# Patient Record
Sex: FEMALE | Race: WHITE | NOT HISPANIC OR LATINO | Employment: OTHER | URBAN - METROPOLITAN AREA
[De-identification: names, ages, dates, MRNs, and addresses within clinical notes are randomized per-mention and may not be internally consistent; named-entity substitution may affect disease eponyms.]

---

## 2017-09-10 PROBLEM — K43.0 INCISIONAL HERNIA, INCARCERATED: Status: ACTIVE | Noted: 2017-09-10

## 2020-11-17 ENCOUNTER — OFFICE VISIT (OUTPATIENT)
Dept: UROLOGY | Facility: CLINIC | Age: 65
End: 2020-11-17
Payer: MEDICARE

## 2020-11-17 VITALS
HEART RATE: 87 BPM | SYSTOLIC BLOOD PRESSURE: 143 MMHG | HEIGHT: 64 IN | BODY MASS INDEX: 27.48 KG/M2 | DIASTOLIC BLOOD PRESSURE: 83 MMHG | WEIGHT: 160.94 LBS

## 2020-11-17 DIAGNOSIS — N39.46 MIXED URINARY INCONTINENCE DUE TO FEMALE GENITAL PROLAPSE: Primary | ICD-10-CM

## 2020-11-17 DIAGNOSIS — N81.9 MIXED URINARY INCONTINENCE DUE TO FEMALE GENITAL PROLAPSE: Primary | ICD-10-CM

## 2020-11-17 DIAGNOSIS — N32.81 OAB (OVERACTIVE BLADDER): ICD-10-CM

## 2020-11-17 PROCEDURE — 99999 PR PBB SHADOW E&M-NEW PATIENT-LVL III: ICD-10-PCS | Mod: PBBFAC,,, | Performed by: UROLOGY

## 2020-11-17 PROCEDURE — 99999 PR PBB SHADOW E&M-NEW PATIENT-LVL III: CPT | Mod: PBBFAC,,, | Performed by: UROLOGY

## 2020-11-17 PROCEDURE — 99203 OFFICE O/P NEW LOW 30 MIN: CPT | Mod: PBBFAC,PO | Performed by: UROLOGY

## 2020-11-17 PROCEDURE — 99204 OFFICE O/P NEW MOD 45 MIN: CPT | Mod: S$PBB,,, | Performed by: UROLOGY

## 2020-11-17 PROCEDURE — 99204 PR OFFICE/OUTPT VISIT, NEW, LEVL IV, 45-59 MIN: ICD-10-PCS | Mod: S$PBB,,, | Performed by: UROLOGY

## 2020-11-17 RX ORDER — TRAZODONE HYDROCHLORIDE 50 MG/1
50 TABLET ORAL NIGHTLY
COMMUNITY
Start: 2020-08-23

## 2020-11-17 RX ORDER — FLUCONAZOLE 150 MG/1
TABLET ORAL
COMMUNITY
Start: 2020-10-27

## 2020-11-17 RX ORDER — ATORVASTATIN CALCIUM 10 MG/1
10 TABLET, FILM COATED ORAL DAILY
COMMUNITY
Start: 2020-11-09

## 2020-11-17 RX ORDER — SEMAGLUTIDE 1.34 MG/ML
INJECTION, SOLUTION SUBCUTANEOUS
COMMUNITY
Start: 2020-11-12 | End: 2022-01-05 | Stop reason: CLARIF

## 2020-11-17 RX ORDER — INFLUENZA A VIRUS A/MICHIGAN/45/2015 X-275 (H1N1) ANTIGEN (FORMALDEHYDE INACTIVATED), INFLUENZA A VIRUS A/SINGAPORE/INFIMH-16-0019/2016 IVR-186 (H3N2) ANTIGEN (FORMALDEHYDE INACTIVATED), INFLUENZA B VIRUS B/PHUKET/3073/2013 ANTIGEN (FORMALDEHYDE INACTIVATED), AND INFLUENZA B VIRUS B/MARYLAND/15/2016 BX-69A ANTIGEN (FORMALDEHYDE INACTIVATED) 60; 60; 60; 60 UG/.7ML; UG/.7ML; UG/.7ML; UG/.7ML
INJECTION, SUSPENSION INTRAMUSCULAR
COMMUNITY
Start: 2020-09-10

## 2020-11-17 RX ORDER — ONDANSETRON 4 MG/1
TABLET, FILM COATED ORAL
COMMUNITY
Start: 2020-11-16

## 2020-11-17 NOTE — PROGRESS NOTES
UROLOGY Willis  11 17 20    Cc bladder prolapse, hematuria    Age 65, says has been noticing a prolapse of her bladder, and she can feel it with her hand. Then it goes back in, if she lies down. Also she has some blood in the urine, the urine tends to be pink on the pantiliner. The urine itself is clear.     Has urinary frequency and urgency. Nocturia x 1-2.        PMH    Surgical:  has a past surgical history that includes Hysterectomy; Ventral hernia repair (09/10/2017); Hernia repair; and Tonsillectomy.    Medical:  has a past medical history of Diabetes mellitus and Hypertension.    Familial: no fh of renal disease    Social: , lives in [place]    Meds:    Current Outpatient Medications on File Prior to Visit   Medication Sig Dispense Refill    dextroamphetamine-amphetamine (ADDERALL XR) 30 MG 24 hr capsule Take 30 mg by mouth every morning.      fluticasone (FLONASE) 50 mcg/actuation nasal spray 1 spray (50 mcg total)  15 g 0    glimepiride (AMARYL) 2 MG tablet Take 1 tablet (2 mg total) by 7 tablet 0    lisinopril (PRINIVIL,ZESTRIL) 2.5 MG tablet Take 1 tablet by mouth once daily.      metFORMIN (GLUCOPHAGE) 500 MG tablet Take 1 tablet (500 mg total) by 60 tablet 0    traMADol (ULTRAM) 50 mg tablet Take 1 tablet (50 mg total) by braxton Pain. 12 tablet 0     REVIEW OF SYSTEMS  GENERAL. No headaches or dizzy spells.   HEENT: vision preserved. Sinuses: has complaints.   CARDIOPULMONARY: No swelling of the legs; no chest pain. No shortness of breath.   GASTROINTESTINAL: No heartburn. Denies diarrhea; denies constipation, no blood or mucus in stools.   GENITOURINARY: Denies dysuria, bleeding or incontinence.   MUSCULOSKELETAL: has arthritic complaints such as pain or stiffness.   PSYCHIATRIC: No history of depression or anxiety.   ENDOCRINOLOGIC: No reports of sweating, cold or heat intolerance.   NEUROLOGICAL: No dizziness, syncope, paralysis  LYMPHATICS: No enlarged nodes. No history of  splenectomy.    Pt alert, oriented, no distress  HEENT: wnl.  Neck: supple, no JVD, no lymphadenopathy  Chest: CV NSR  Lungs: normal chest expansion, no labored breathing  Abdomen flat, nontender, no organomegaly, no masses.  Extremities: no edema, peripheral pulses nl  Neuro: preserved      IMP    Mixed urinary incontinence.  We discussed kegel exercises in detail  May benefit from use of ditropan xl 10 mg daily  RTC prn

## 2021-04-08 ENCOUNTER — TELEPHONE (OUTPATIENT)
Dept: UROLOGY | Facility: CLINIC | Age: 66
End: 2021-04-08

## 2021-06-11 ENCOUNTER — OFFICE VISIT (OUTPATIENT)
Dept: UROLOGY | Facility: CLINIC | Age: 66
End: 2021-06-11
Payer: MEDICARE

## 2021-06-11 VITALS
HEART RATE: 96 BPM | DIASTOLIC BLOOD PRESSURE: 70 MMHG | WEIGHT: 154.13 LBS | BODY MASS INDEX: 26.31 KG/M2 | SYSTOLIC BLOOD PRESSURE: 135 MMHG | HEIGHT: 64 IN

## 2021-06-11 DIAGNOSIS — N81.11 MIDLINE CYSTOCELE: Primary | ICD-10-CM

## 2021-06-11 DIAGNOSIS — N81.9 VAGINAL VAULT PROLAPSE: ICD-10-CM

## 2021-06-11 PROCEDURE — 99215 OFFICE O/P EST HI 40 MIN: CPT | Mod: 25,S$GLB,, | Performed by: UROLOGY

## 2021-06-11 PROCEDURE — 1126F AMNT PAIN NOTED NONE PRSNT: CPT | Mod: S$GLB,,, | Performed by: UROLOGY

## 2021-06-11 PROCEDURE — 1159F PR MEDICATION LIST DOCUMENTED IN MEDICAL RECORD: ICD-10-PCS | Mod: S$GLB,,, | Performed by: UROLOGY

## 2021-06-11 PROCEDURE — 3008F PR BODY MASS INDEX (BMI) DOCUMENTED: ICD-10-PCS | Mod: CPTII,S$GLB,, | Performed by: UROLOGY

## 2021-06-11 PROCEDURE — 81002 URINALYSIS NONAUTO W/O SCOPE: CPT | Mod: S$GLB,,, | Performed by: UROLOGY

## 2021-06-11 PROCEDURE — 3008F BODY MASS INDEX DOCD: CPT | Mod: CPTII,S$GLB,, | Performed by: UROLOGY

## 2021-06-11 PROCEDURE — 1101F PT FALLS ASSESS-DOCD LE1/YR: CPT | Mod: CPTII,S$GLB,, | Performed by: UROLOGY

## 2021-06-11 PROCEDURE — 1159F MED LIST DOCD IN RCRD: CPT | Mod: S$GLB,,, | Performed by: UROLOGY

## 2021-06-11 PROCEDURE — 1126F PR PAIN SEVERITY QUANTIFIED, NO PAIN PRESENT: ICD-10-PCS | Mod: S$GLB,,, | Performed by: UROLOGY

## 2021-06-11 PROCEDURE — 51701 INSERT BLADDER CATHETER: CPT | Mod: S$GLB,,, | Performed by: UROLOGY

## 2021-06-11 PROCEDURE — 99999 PR PBB SHADOW E&M-EST. PATIENT-LVL III: CPT | Mod: PBBFAC,,, | Performed by: UROLOGY

## 2021-06-11 PROCEDURE — 99999 PR PBB SHADOW E&M-EST. PATIENT-LVL III: ICD-10-PCS | Mod: PBBFAC,,, | Performed by: UROLOGY

## 2021-06-11 PROCEDURE — 51701 PR INSERTION OF NON-INDWELLING BLADDER CATHETERIZATION FOR RESIDUAL UR: ICD-10-PCS | Mod: S$GLB,,, | Performed by: UROLOGY

## 2021-06-11 PROCEDURE — 3288F FALL RISK ASSESSMENT DOCD: CPT | Mod: CPTII,S$GLB,, | Performed by: UROLOGY

## 2021-06-11 PROCEDURE — 1101F PR PT FALLS ASSESS DOC 0-1 FALLS W/OUT INJ PAST YR: ICD-10-PCS | Mod: CPTII,S$GLB,, | Performed by: UROLOGY

## 2021-06-11 PROCEDURE — 3288F PR FALLS RISK ASSESSMENT DOCUMENTED: ICD-10-PCS | Mod: CPTII,S$GLB,, | Performed by: UROLOGY

## 2021-06-11 PROCEDURE — 99215 PR OFFICE/OUTPT VISIT, EST, LEVL V, 40-54 MIN: ICD-10-PCS | Mod: 25,S$GLB,, | Performed by: UROLOGY

## 2021-06-11 PROCEDURE — 81002 PR URINALYSIS NONAUTO W/O SCOPE: ICD-10-PCS | Mod: S$GLB,,, | Performed by: UROLOGY

## 2021-06-11 RX ORDER — IBUPROFEN 800 MG/1
TABLET ORAL
COMMUNITY
Start: 2021-05-20

## 2021-06-11 RX ORDER — LINAGLIPTIN 5 MG/1
TABLET, FILM COATED ORAL
COMMUNITY
Start: 2021-06-07

## 2021-06-11 RX ORDER — LIDOCAINE HYDROCHLORIDE 20 MG/ML
JELLY TOPICAL ONCE
Status: CANCELLED | OUTPATIENT
Start: 2021-06-11 | End: 2021-06-11

## 2021-06-11 RX ORDER — DOXYCYCLINE HYCLATE 100 MG
100 TABLET ORAL ONCE
Status: CANCELLED | OUTPATIENT
Start: 2021-06-11 | End: 2021-06-11

## 2021-07-07 ENCOUNTER — PROCEDURE VISIT (OUTPATIENT)
Dept: UROLOGY | Facility: CLINIC | Age: 66
End: 2021-07-07
Payer: MEDICARE

## 2021-07-07 VITALS
WEIGHT: 154 LBS | HEART RATE: 87 BPM | BODY MASS INDEX: 26.29 KG/M2 | TEMPERATURE: 98 F | HEIGHT: 64 IN | SYSTOLIC BLOOD PRESSURE: 130 MMHG | DIASTOLIC BLOOD PRESSURE: 81 MMHG

## 2021-07-07 DIAGNOSIS — N81.11 MIDLINE CYSTOCELE: ICD-10-CM

## 2021-07-07 DIAGNOSIS — N81.9 VAGINAL VAULT PROLAPSE: ICD-10-CM

## 2021-07-07 PROCEDURE — 51728 CYSTOMETROGRAM W/VP: CPT | Mod: 26,S$GLB,, | Performed by: UROLOGY

## 2021-07-07 PROCEDURE — 51784 ANAL/URINARY MUSCLE STUDY: CPT | Mod: 26,51,S$GLB, | Performed by: UROLOGY

## 2021-07-07 PROCEDURE — 51797 PR VOIDING PRESS STUDY INTRA-ABDOMINAL VOID: ICD-10-PCS | Mod: 26,S$GLB,, | Performed by: UROLOGY

## 2021-07-07 PROCEDURE — 51728 PR COMPLEX CYSTOMETROGRAM VOIDING PRESSURE STUDIES: ICD-10-PCS | Mod: 26,S$GLB,, | Performed by: UROLOGY

## 2021-07-07 PROCEDURE — 51784 PR ANAL/URINARY MUSCLE STUDY: ICD-10-PCS | Mod: 26,51,S$GLB, | Performed by: UROLOGY

## 2021-07-07 PROCEDURE — 51797 INTRAABDOMINAL PRESSURE TEST: CPT | Mod: 26,S$GLB,, | Performed by: UROLOGY

## 2021-07-07 PROCEDURE — 52000 PR CYSTOURETHROSCOPY: ICD-10-PCS | Mod: 59,S$GLB,, | Performed by: UROLOGY

## 2021-07-07 PROCEDURE — 52000 CYSTOURETHROSCOPY: CPT | Mod: 59,S$GLB,, | Performed by: UROLOGY

## 2021-07-07 RX ORDER — LIDOCAINE HYDROCHLORIDE 20 MG/ML
JELLY TOPICAL ONCE
Status: COMPLETED | OUTPATIENT
Start: 2021-07-07 | End: 2021-07-07

## 2021-07-07 RX ORDER — DOXYCYCLINE HYCLATE 100 MG
100 TABLET ORAL ONCE
Status: COMPLETED | OUTPATIENT
Start: 2021-07-07 | End: 2021-07-07

## 2021-07-07 RX ADMIN — LIDOCAINE HYDROCHLORIDE: 20 JELLY TOPICAL at 09:07

## 2021-07-07 RX ADMIN — Medication 100 MG: at 09:07

## 2021-08-19 ENCOUNTER — OFFICE VISIT (OUTPATIENT)
Dept: URGENT CARE | Facility: CLINIC | Age: 66
End: 2021-08-19
Payer: MEDICARE

## 2021-08-19 VITALS
RESPIRATION RATE: 16 BRPM | TEMPERATURE: 98 F | SYSTOLIC BLOOD PRESSURE: 148 MMHG | WEIGHT: 154 LBS | DIASTOLIC BLOOD PRESSURE: 78 MMHG | BODY MASS INDEX: 26.29 KG/M2 | HEART RATE: 97 BPM | HEIGHT: 64 IN | OXYGEN SATURATION: 100 %

## 2021-08-19 DIAGNOSIS — R09.81 NASAL CONGESTION: ICD-10-CM

## 2021-08-19 DIAGNOSIS — J06.9 UPPER RESPIRATORY TRACT INFECTION, UNSPECIFIED TYPE: Primary | ICD-10-CM

## 2021-08-19 LAB
CTP QC/QA: YES
SARS-COV-2 RDRP RESP QL NAA+PROBE: NEGATIVE

## 2021-08-19 PROCEDURE — 1160F PR REVIEW ALL MEDS BY PRESCRIBER/CLIN PHARMACIST DOCUMENTED: ICD-10-PCS | Mod: CPTII,S$GLB,, | Performed by: PHYSICIAN ASSISTANT

## 2021-08-19 PROCEDURE — 3077F SYST BP >= 140 MM HG: CPT | Mod: CPTII,S$GLB,, | Performed by: PHYSICIAN ASSISTANT

## 2021-08-19 PROCEDURE — 3008F PR BODY MASS INDEX (BMI) DOCUMENTED: ICD-10-PCS | Mod: CPTII,S$GLB,, | Performed by: PHYSICIAN ASSISTANT

## 2021-08-19 PROCEDURE — 1160F RVW MEDS BY RX/DR IN RCRD: CPT | Mod: CPTII,S$GLB,, | Performed by: PHYSICIAN ASSISTANT

## 2021-08-19 PROCEDURE — U0002 COVID-19 LAB TEST NON-CDC: HCPCS | Mod: QW,S$GLB,, | Performed by: PHYSICIAN ASSISTANT

## 2021-08-19 PROCEDURE — 3078F DIAST BP <80 MM HG: CPT | Mod: CPTII,S$GLB,, | Performed by: PHYSICIAN ASSISTANT

## 2021-08-19 PROCEDURE — U0002: ICD-10-PCS | Mod: QW,S$GLB,, | Performed by: PHYSICIAN ASSISTANT

## 2021-08-19 PROCEDURE — 99203 PR OFFICE/OUTPT VISIT, NEW, LEVL III, 30-44 MIN: ICD-10-PCS | Mod: S$GLB,,, | Performed by: PHYSICIAN ASSISTANT

## 2021-08-19 PROCEDURE — 1159F MED LIST DOCD IN RCRD: CPT | Mod: CPTII,S$GLB,, | Performed by: PHYSICIAN ASSISTANT

## 2021-08-19 PROCEDURE — 3008F BODY MASS INDEX DOCD: CPT | Mod: CPTII,S$GLB,, | Performed by: PHYSICIAN ASSISTANT

## 2021-08-19 PROCEDURE — 3077F PR MOST RECENT SYSTOLIC BLOOD PRESSURE >= 140 MM HG: ICD-10-PCS | Mod: CPTII,S$GLB,, | Performed by: PHYSICIAN ASSISTANT

## 2021-08-19 PROCEDURE — 1159F PR MEDICATION LIST DOCUMENTED IN MEDICAL RECORD: ICD-10-PCS | Mod: CPTII,S$GLB,, | Performed by: PHYSICIAN ASSISTANT

## 2021-08-19 PROCEDURE — 99203 OFFICE O/P NEW LOW 30 MIN: CPT | Mod: S$GLB,,, | Performed by: PHYSICIAN ASSISTANT

## 2021-08-19 PROCEDURE — 3078F PR MOST RECENT DIASTOLIC BLOOD PRESSURE < 80 MM HG: ICD-10-PCS | Mod: CPTII,S$GLB,, | Performed by: PHYSICIAN ASSISTANT

## 2021-11-22 ENCOUNTER — TELEPHONE (OUTPATIENT)
Dept: UROLOGY | Facility: CLINIC | Age: 66
End: 2021-11-22
Payer: MEDICARE

## 2021-11-22 DIAGNOSIS — N81.11 MIDLINE CYSTOCELE: Primary | ICD-10-CM

## 2021-11-22 DIAGNOSIS — N81.9 VAGINAL VAULT PROLAPSE: ICD-10-CM

## 2022-01-05 ENCOUNTER — TELEPHONE (OUTPATIENT)
Dept: PREADMISSION TESTING | Facility: HOSPITAL | Age: 67
End: 2022-01-05
Payer: MEDICARE

## 2022-01-05 NOTE — ANESTHESIA PAT ROS NOTE
01/05/2022  Cassandra Garcia is a 66 y.o., female.      Pre-op Assessment          Review of Systems  Anesthesia Hx:  No problems with previous Anesthesia  History of prior surgery of interest to airway management or planning: Previous anesthesia: General 9/10/17 ventral hernia repair with general anesthesia.    Social:  No Alcohol Use, Non-Smoker    Hematology/Oncology:  Hematology Normal   Oncology Normal     EENT/Dental:   States having issues with dentures // felt they were obstructing her   Went to urgent care 1/1/22   Cardiovascular:   Exercise tolerance: poor Hypertension hyperlipidemia    Pulmonary:  Pulmonary Normal    Renal/:   Midline cystocele  Vaginal wall prolapse   Hepatic/GI:   Hx of hernia repair   Musculoskeletal:   States gets around independently   OB/GYN/PEDS:  Vaginal vault prolapse   Neurological:  Neurology Normal    Endocrine:   Diabetes, type 2 Blood sugars 120s  States A1c was 7   redrawing A1c 1/7/22   Dermatological:  Skin Normal    Psych:   ADD Attention Deficit Disorder.            Anesthesia Assessment: Preoperative EQUATION    Planned Procedure: Procedure(s) (LRB):  SSL FIXATION (N/A)  COLPORRHAPHY, ANTERIOR (N/A)  Requested Anesthesia Type:General  Surgeon: Simi Mckeon MD  Service: Urology  Known or anticipated Date of Surgery:1/11/2022    Surgeon notes: reviewed    Electronic QUestionnaire Assessment completed via nurse interview with patient.        Triage considerations:     The patient has no apparent active cardiac condition (No unstable coronary Syndrome such as severe unstable angina or recent [<1 month] myocardial infarction, decompensated CHF, severe valvular   disease or significant arrhythmia)    Previous anesthesia records:BRENDA 9/10/17    Last PCP note: outside [facility] Dr. Judy Castle  [phone]  Subspecialty notes: Urology    Other important  co-morbidities: DM2, HLD and HTN ADD      Tests already available:  No recent tests.             Instructions given. (See in Nurse's note)    Optimization: Denies any issues with anesthesia      Plan:    Testing:  A1C, CMP, EKG and Hemoglobin   Pre-anesthesia  visit       Visit focus: denies any issues with anesthesia     Consultation:PCP is moving to independent office      Navigation: Tests Scheduled.               Results will be tracked by Preop Clinic.                .

## 2022-01-05 NOTE — PRE-PROCEDURE INSTRUCTIONS
"Spoke to patient reviewed meds // does not have portal // will mail instructions to pt. Needs labs & EKG -- in [place]  States has a covid test Friday in [place]  Was recently seen For URI in a urgent care  States having issues with dentures and felt they were obstructing and she had "pus pockets" in her mouth    "HENT:   Head: Normocephalic and atraumatic.   Mouth/Throat: Oropharynx is clear and moist. No oropharyngeal exudate.   Uvula midline, no pus noted as patient stated.  Minimal erythema to the posterior pharynx. "  Dr. Sy Jarvis  Urgent care 1/1/22  "

## 2022-01-05 NOTE — TELEPHONE ENCOUNTER
----- Message from Yanique Handy RN sent at 1/5/2022 10:28 AM CST -----  Needs Labs & ekg -- orders in  states going to [place] Friday for covid test

## 2022-01-06 ENCOUNTER — CLINICAL SUPPORT (OUTPATIENT)
Dept: CARDIOLOGY | Facility: HOSPITAL | Age: 67
End: 2022-01-06
Attending: ANESTHESIOLOGY
Payer: MEDICARE

## 2022-01-06 DIAGNOSIS — Z01.818 PRE-OP TESTING: ICD-10-CM

## 2022-01-06 PROCEDURE — 93010 EKG 12-LEAD: ICD-10-PCS | Mod: ,,, | Performed by: INTERNAL MEDICINE

## 2022-01-06 PROCEDURE — 93010 ELECTROCARDIOGRAM REPORT: CPT | Mod: ,,, | Performed by: INTERNAL MEDICINE

## 2022-01-06 PROCEDURE — 93005 ELECTROCARDIOGRAM TRACING: CPT | Mod: PO

## 2022-01-07 ENCOUNTER — TELEPHONE (OUTPATIENT)
Dept: UROLOGY | Facility: CLINIC | Age: 67
End: 2022-01-07
Payer: MEDICARE

## 2022-01-07 DIAGNOSIS — N81.11 MIDLINE CYSTOCELE: Primary | ICD-10-CM

## 2022-01-08 ENCOUNTER — LAB VISIT (OUTPATIENT)
Dept: FAMILY MEDICINE | Facility: CLINIC | Age: 67
End: 2022-01-08
Payer: MEDICARE

## 2022-01-08 DIAGNOSIS — N81.11 MIDLINE CYSTOCELE: ICD-10-CM

## 2022-01-08 PROCEDURE — U0003 INFECTIOUS AGENT DETECTION BY NUCLEIC ACID (DNA OR RNA); SEVERE ACUTE RESPIRATORY SYNDROME CORONAVIRUS 2 (SARS-COV-2) (CORONAVIRUS DISEASE [COVID-19]), AMPLIFIED PROBE TECHNIQUE, MAKING USE OF HIGH THROUGHPUT TECHNOLOGIES AS DESCRIBED BY CMS-2020-01-R: HCPCS | Performed by: NURSE PRACTITIONER

## 2022-01-08 PROCEDURE — U0005 INFEC AGEN DETEC AMPLI PROBE: HCPCS | Performed by: NURSE PRACTITIONER

## 2022-01-09 LAB
SARS-COV-2 RNA RESP QL NAA+PROBE: NOT DETECTED
SARS-COV-2- CYCLE NUMBER: NORMAL

## 2022-01-11 ENCOUNTER — HOSPITAL ENCOUNTER (OUTPATIENT)
Facility: HOSPITAL | Age: 67
Discharge: HOME | End: 2022-01-12
Attending: UROLOGY | Admitting: UROLOGY
Payer: MEDICARE

## 2022-01-11 ENCOUNTER — ANESTHESIA (OUTPATIENT)
Dept: SURGERY | Facility: HOSPITAL | Age: 67
End: 2022-01-11
Payer: MEDICARE

## 2022-01-11 ENCOUNTER — ANESTHESIA EVENT (OUTPATIENT)
Dept: SURGERY | Facility: HOSPITAL | Age: 67
End: 2022-01-11
Payer: MEDICARE

## 2022-01-11 DIAGNOSIS — N81.9 PROLAPSE OF FEMALE PELVIC ORGANS: Primary | ICD-10-CM

## 2022-01-11 DIAGNOSIS — N81.9 VAGINAL VAULT PROLAPSE: ICD-10-CM

## 2022-01-11 LAB
POCT GLUCOSE: 164 MG/DL (ref 70–110)
POCT GLUCOSE: 216 MG/DL (ref 70–110)

## 2022-01-11 PROCEDURE — 37000009 HC ANESTHESIA EA ADD 15 MINS: Performed by: UROLOGY

## 2022-01-11 PROCEDURE — 63600175 PHARM REV CODE 636 W HCPCS: Performed by: NURSE ANESTHETIST, CERTIFIED REGISTERED

## 2022-01-11 PROCEDURE — 27201423 OPTIME MED/SURG SUP & DEVICES STERILE SUPPLY: Performed by: UROLOGY

## 2022-01-11 PROCEDURE — 82962 GLUCOSE BLOOD TEST: CPT | Performed by: UROLOGY

## 2022-01-11 PROCEDURE — 63600175 PHARM REV CODE 636 W HCPCS: Performed by: ANESTHESIOLOGY

## 2022-01-11 PROCEDURE — 57282 COLPOPEXY EXTRAPERITONEAL: CPT | Mod: 51,,, | Performed by: UROLOGY

## 2022-01-11 PROCEDURE — 63600175 PHARM REV CODE 636 W HCPCS: Performed by: STUDENT IN AN ORGANIZED HEALTH CARE EDUCATION/TRAINING PROGRAM

## 2022-01-11 PROCEDURE — 25000003 PHARM REV CODE 250: Performed by: NURSE ANESTHETIST, CERTIFIED REGISTERED

## 2022-01-11 PROCEDURE — 57240 ANTERIOR COLPORRHAPHY: CPT | Mod: ,,, | Performed by: UROLOGY

## 2022-01-11 PROCEDURE — 25000003 PHARM REV CODE 250: Performed by: UROLOGY

## 2022-01-11 PROCEDURE — 71000015 HC POSTOP RECOV 1ST HR: Performed by: UROLOGY

## 2022-01-11 PROCEDURE — 88305 TISSUE EXAM BY PATHOLOGIST: CPT | Performed by: PATHOLOGY

## 2022-01-11 PROCEDURE — 57282 PR REVAGINAL PROLAPSE,SACROSP LIG: ICD-10-PCS | Mod: 51,,, | Performed by: UROLOGY

## 2022-01-11 PROCEDURE — D9220A PRA ANESTHESIA: Mod: CRNA,,, | Performed by: NURSE ANESTHETIST, CERTIFIED REGISTERED

## 2022-01-11 PROCEDURE — C2631 REP DEV, URINARY, W/O SLING: HCPCS | Performed by: UROLOGY

## 2022-01-11 PROCEDURE — 37000008 HC ANESTHESIA 1ST 15 MINUTES: Performed by: UROLOGY

## 2022-01-11 PROCEDURE — 25000003 PHARM REV CODE 250: Performed by: STUDENT IN AN ORGANIZED HEALTH CARE EDUCATION/TRAINING PROGRAM

## 2022-01-11 PROCEDURE — 71000033 HC RECOVERY, INTIAL HOUR: Performed by: UROLOGY

## 2022-01-11 PROCEDURE — D9220A PRA ANESTHESIA: ICD-10-PCS | Mod: CRNA,,, | Performed by: NURSE ANESTHETIST, CERTIFIED REGISTERED

## 2022-01-11 PROCEDURE — 94799 UNLISTED PULMONARY SVC/PX: CPT

## 2022-01-11 PROCEDURE — D9220A PRA ANESTHESIA: ICD-10-PCS | Mod: ANES,,, | Performed by: ANESTHESIOLOGY

## 2022-01-11 PROCEDURE — 99900035 HC TECH TIME PER 15 MIN (STAT)

## 2022-01-11 PROCEDURE — 57240: ICD-10-PCS | Mod: ,,, | Performed by: UROLOGY

## 2022-01-11 PROCEDURE — 36000709 HC OR TIME LEV III EA ADD 15 MIN: Performed by: UROLOGY

## 2022-01-11 PROCEDURE — D9220A PRA ANESTHESIA: Mod: ANES,,, | Performed by: ANESTHESIOLOGY

## 2022-01-11 PROCEDURE — 36000708 HC OR TIME LEV III 1ST 15 MIN: Performed by: UROLOGY

## 2022-01-11 PROCEDURE — 88305 TISSUE EXAM BY PATHOLOGIST: CPT | Mod: 26,,, | Performed by: PATHOLOGY

## 2022-01-11 PROCEDURE — 94761 N-INVAS EAR/PLS OXIMETRY MLT: CPT

## 2022-01-11 PROCEDURE — 88305 TISSUE EXAM BY PATHOLOGIST: ICD-10-PCS | Mod: 26,,, | Performed by: PATHOLOGY

## 2022-01-11 PROCEDURE — 71000016 HC POSTOP RECOV ADDL HR: Performed by: UROLOGY

## 2022-01-11 RX ORDER — EPHEDRINE SULFATE 50 MG/ML
INJECTION, SOLUTION INTRAVENOUS
Status: DISCONTINUED | OUTPATIENT
Start: 2022-01-11 | End: 2022-01-11

## 2022-01-11 RX ORDER — TRAZODONE HYDROCHLORIDE 50 MG/1
50 TABLET ORAL NIGHTLY
Status: DISCONTINUED | OUTPATIENT
Start: 2022-01-11 | End: 2022-01-12 | Stop reason: HOSPADM

## 2022-01-11 RX ORDER — DEXAMETHASONE SODIUM PHOSPHATE 4 MG/ML
INJECTION, SOLUTION INTRA-ARTICULAR; INTRALESIONAL; INTRAMUSCULAR; INTRAVENOUS; SOFT TISSUE
Status: DISCONTINUED | OUTPATIENT
Start: 2022-01-11 | End: 2022-01-11

## 2022-01-11 RX ORDER — LIDOCAINE HYDROCHLORIDE 20 MG/ML
INJECTION, SOLUTION EPIDURAL; INFILTRATION; INTRACAUDAL; PERINEURAL
Status: DISCONTINUED | OUTPATIENT
Start: 2022-01-11 | End: 2022-01-11

## 2022-01-11 RX ORDER — GLUCAGON 1 MG
1 KIT INJECTION
Status: DISCONTINUED | OUTPATIENT
Start: 2022-01-11 | End: 2022-01-12 | Stop reason: HOSPADM

## 2022-01-11 RX ORDER — HYDROMORPHONE HYDROCHLORIDE 1 MG/ML
0.2 INJECTION, SOLUTION INTRAMUSCULAR; INTRAVENOUS; SUBCUTANEOUS EVERY 5 MIN PRN
Status: DISCONTINUED | OUTPATIENT
Start: 2022-01-11 | End: 2022-01-11 | Stop reason: HOSPADM

## 2022-01-11 RX ORDER — FLUTICASONE PROPIONATE 50 MCG
1 SPRAY, SUSPENSION (ML) NASAL DAILY
Status: DISCONTINUED | OUTPATIENT
Start: 2022-01-12 | End: 2022-01-12 | Stop reason: HOSPADM

## 2022-01-11 RX ORDER — LIDOCAINE HYDROCHLORIDE 10 MG/ML
INJECTION, SOLUTION EPIDURAL; INFILTRATION; INTRACAUDAL; PERINEURAL
Status: DISCONTINUED | OUTPATIENT
Start: 2022-01-11 | End: 2022-01-11 | Stop reason: HOSPADM

## 2022-01-11 RX ORDER — TRAMADOL HYDROCHLORIDE 50 MG/1
50 TABLET ORAL EVERY 6 HOURS PRN
Status: DISCONTINUED | OUTPATIENT
Start: 2022-01-11 | End: 2022-01-12 | Stop reason: HOSPADM

## 2022-01-11 RX ORDER — OXYCODONE HYDROCHLORIDE 5 MG/1
5 TABLET ORAL EVERY 4 HOURS PRN
Status: DISCONTINUED | OUTPATIENT
Start: 2022-01-11 | End: 2022-01-12 | Stop reason: HOSPADM

## 2022-01-11 RX ORDER — NEOSTIGMINE METHYLSULFATE 0.5 MG/ML
INJECTION, SOLUTION INTRAVENOUS
Status: DISCONTINUED | OUTPATIENT
Start: 2022-01-11 | End: 2022-01-11

## 2022-01-11 RX ORDER — ATORVASTATIN CALCIUM 10 MG/1
10 TABLET, FILM COATED ORAL DAILY
Status: DISCONTINUED | OUTPATIENT
Start: 2022-01-12 | End: 2022-01-12 | Stop reason: HOSPADM

## 2022-01-11 RX ORDER — PHENYLEPHRINE HCL IN 0.9% NACL 1 MG/10 ML
SYRINGE (ML) INTRAVENOUS
Status: DISCONTINUED | OUTPATIENT
Start: 2022-01-11 | End: 2022-01-11

## 2022-01-11 RX ORDER — OXYCODONE HYDROCHLORIDE 10 MG/1
10 TABLET ORAL EVERY 4 HOURS PRN
Status: DISCONTINUED | OUTPATIENT
Start: 2022-01-11 | End: 2022-01-12 | Stop reason: HOSPADM

## 2022-01-11 RX ORDER — ROCURONIUM BROMIDE 10 MG/ML
INJECTION, SOLUTION INTRAVENOUS
Status: DISCONTINUED | OUTPATIENT
Start: 2022-01-11 | End: 2022-01-11

## 2022-01-11 RX ORDER — INSULIN ASPART 100 [IU]/ML
1-10 INJECTION, SOLUTION INTRAVENOUS; SUBCUTANEOUS
Status: DISCONTINUED | OUTPATIENT
Start: 2022-01-11 | End: 2022-01-12 | Stop reason: HOSPADM

## 2022-01-11 RX ORDER — LORAZEPAM 2 MG/ML
0.25 INJECTION INTRAMUSCULAR ONCE AS NEEDED
Status: DISCONTINUED | OUTPATIENT
Start: 2022-01-11 | End: 2022-01-11 | Stop reason: HOSPADM

## 2022-01-11 RX ORDER — SODIUM CHLORIDE 9 MG/ML
INJECTION, SOLUTION INTRAVENOUS CONTINUOUS
Status: DISCONTINUED | OUTPATIENT
Start: 2022-01-11 | End: 2022-01-11

## 2022-01-11 RX ORDER — ONDANSETRON 2 MG/ML
INJECTION INTRAMUSCULAR; INTRAVENOUS
Status: DISCONTINUED | OUTPATIENT
Start: 2022-01-11 | End: 2022-01-11

## 2022-01-11 RX ORDER — SODIUM CHLORIDE, SODIUM LACTATE, POTASSIUM CHLORIDE, CALCIUM CHLORIDE 600; 310; 30; 20 MG/100ML; MG/100ML; MG/100ML; MG/100ML
INJECTION, SOLUTION INTRAVENOUS CONTINUOUS
Status: DISCONTINUED | OUTPATIENT
Start: 2022-01-11 | End: 2022-01-12 | Stop reason: HOSPADM

## 2022-01-11 RX ORDER — BUPIVACAINE HYDROCHLORIDE 2.5 MG/ML
INJECTION, SOLUTION EPIDURAL; INFILTRATION; INTRACAUDAL
Status: DISCONTINUED | OUTPATIENT
Start: 2022-01-11 | End: 2022-01-11 | Stop reason: HOSPADM

## 2022-01-11 RX ORDER — IBUPROFEN 200 MG
16 TABLET ORAL
Status: DISCONTINUED | OUTPATIENT
Start: 2022-01-11 | End: 2022-01-12 | Stop reason: HOSPADM

## 2022-01-11 RX ORDER — MIDAZOLAM HYDROCHLORIDE 1 MG/ML
INJECTION, SOLUTION INTRAMUSCULAR; INTRAVENOUS
Status: DISCONTINUED | OUTPATIENT
Start: 2022-01-11 | End: 2022-01-11

## 2022-01-11 RX ORDER — IBUPROFEN 200 MG
24 TABLET ORAL
Status: DISCONTINUED | OUTPATIENT
Start: 2022-01-11 | End: 2022-01-12 | Stop reason: HOSPADM

## 2022-01-11 RX ORDER — ONDANSETRON 2 MG/ML
4 INJECTION INTRAMUSCULAR; INTRAVENOUS EVERY 6 HOURS PRN
Status: DISCONTINUED | OUTPATIENT
Start: 2022-01-11 | End: 2022-01-12 | Stop reason: HOSPADM

## 2022-01-11 RX ORDER — LIDOCAINE HYDROCHLORIDE 10 MG/ML
1 INJECTION, SOLUTION EPIDURAL; INFILTRATION; INTRACAUDAL; PERINEURAL ONCE
Status: DISCONTINUED | OUTPATIENT
Start: 2022-01-11 | End: 2022-01-11

## 2022-01-11 RX ORDER — SODIUM CHLORIDE 9 MG/ML
INJECTION, SOLUTION INTRAVENOUS CONTINUOUS
Status: DISCONTINUED | OUTPATIENT
Start: 2022-01-11 | End: 2022-01-12 | Stop reason: HOSPADM

## 2022-01-11 RX ORDER — CEFAZOLIN SODIUM 2 G/50ML
2 SOLUTION INTRAVENOUS
Status: COMPLETED | OUTPATIENT
Start: 2022-01-11 | End: 2022-01-11

## 2022-01-11 RX ORDER — SODIUM CHLORIDE 0.9 % (FLUSH) 0.9 %
10 SYRINGE (ML) INJECTION
Status: DISCONTINUED | OUTPATIENT
Start: 2022-01-11 | End: 2022-01-12 | Stop reason: HOSPADM

## 2022-01-11 RX ORDER — ACETAMINOPHEN 500 MG
1000 TABLET ORAL EVERY 6 HOURS
Status: DISCONTINUED | OUTPATIENT
Start: 2022-01-11 | End: 2022-01-12 | Stop reason: HOSPADM

## 2022-01-11 RX ORDER — LISINOPRIL 2.5 MG/1
2.5 TABLET ORAL DAILY
Status: DISCONTINUED | OUTPATIENT
Start: 2022-01-12 | End: 2022-01-12 | Stop reason: HOSPADM

## 2022-01-11 RX ORDER — TALC
6 POWDER (GRAM) TOPICAL NIGHTLY PRN
Status: DISCONTINUED | OUTPATIENT
Start: 2022-01-11 | End: 2022-01-12 | Stop reason: HOSPADM

## 2022-01-11 RX ORDER — PROPOFOL 10 MG/ML
VIAL (ML) INTRAVENOUS
Status: DISCONTINUED | OUTPATIENT
Start: 2022-01-11 | End: 2022-01-11

## 2022-01-11 RX ORDER — DIPHENHYDRAMINE HCL 25 MG
25 CAPSULE ORAL EVERY 12 HOURS PRN
Status: DISCONTINUED | OUTPATIENT
Start: 2022-01-11 | End: 2022-01-12 | Stop reason: HOSPADM

## 2022-01-11 RX ORDER — FENTANYL CITRATE 50 UG/ML
INJECTION, SOLUTION INTRAMUSCULAR; INTRAVENOUS
Status: DISCONTINUED | OUTPATIENT
Start: 2022-01-11 | End: 2022-01-11

## 2022-01-11 RX ADMIN — Medication 200 MCG: at 02:01

## 2022-01-11 RX ADMIN — INDIGO CARMINE 5 ML: 8 INJECTION, SOLUTION INTRAMUSCULAR; INTRAVENOUS at 03:01

## 2022-01-11 RX ADMIN — TRAZODONE HYDROCHLORIDE 50 MG: 50 TABLET ORAL at 08:01

## 2022-01-11 RX ADMIN — OXYCODONE 10 MG: 5 TABLET ORAL at 05:01

## 2022-01-11 RX ADMIN — ROCURONIUM BROMIDE 10 MG: 10 INJECTION, SOLUTION INTRAVENOUS at 02:01

## 2022-01-11 RX ADMIN — ACETAMINOPHEN 1000 MG: 500 TABLET ORAL at 05:01

## 2022-01-11 RX ADMIN — Medication 100 MCG: at 01:01

## 2022-01-11 RX ADMIN — SODIUM CHLORIDE: 0.9 INJECTION, SOLUTION INTRAVENOUS at 01:01

## 2022-01-11 RX ADMIN — SODIUM CHLORIDE, SODIUM LACTATE, POTASSIUM CHLORIDE, AND CALCIUM CHLORIDE: .6; .31; .03; .02 INJECTION, SOLUTION INTRAVENOUS at 07:01

## 2022-01-11 RX ADMIN — HYDROMORPHONE HYDROCHLORIDE 0.2 MG: 1 INJECTION, SOLUTION INTRAMUSCULAR; INTRAVENOUS; SUBCUTANEOUS at 05:01

## 2022-01-11 RX ADMIN — FENTANYL CITRATE 25 MCG: 50 INJECTION INTRAMUSCULAR; INTRAVENOUS at 03:01

## 2022-01-11 RX ADMIN — DEXAMETHASONE SODIUM PHOSPHATE 4 MG: 4 INJECTION INTRA-ARTICULAR; INTRALESIONAL; INTRAMUSCULAR; INTRAVENOUS; SOFT TISSUE at 01:01

## 2022-01-11 RX ADMIN — MIDAZOLAM 2 MG: 1 INJECTION INTRAMUSCULAR; INTRAVENOUS at 01:01

## 2022-01-11 RX ADMIN — GLYCOPYRROLATE 0.6 MG: 0.2 INJECTION INTRAMUSCULAR; INTRAVENOUS at 04:01

## 2022-01-11 RX ADMIN — ROCURONIUM BROMIDE 50 MG: 10 INJECTION, SOLUTION INTRAVENOUS at 01:01

## 2022-01-11 RX ADMIN — EPHEDRINE SULFATE 10 MG: 50 INJECTION INTRAVENOUS at 02:01

## 2022-01-11 RX ADMIN — FENTANYL CITRATE 100 MCG: 50 INJECTION INTRAMUSCULAR; INTRAVENOUS at 01:01

## 2022-01-11 RX ADMIN — ONDANSETRON 4 MG: 2 INJECTION INTRAMUSCULAR; INTRAVENOUS at 04:01

## 2022-01-11 RX ADMIN — SODIUM CHLORIDE, SODIUM GLUCONATE, SODIUM ACETATE, POTASSIUM CHLORIDE, MAGNESIUM CHLORIDE, SODIUM PHOSPHATE, DIBASIC, AND POTASSIUM PHOSPHATE: .53; .5; .37; .037; .03; .012; .00082 INJECTION, SOLUTION INTRAVENOUS at 02:01

## 2022-01-11 RX ADMIN — Medication 100 MCG: at 02:01

## 2022-01-11 RX ADMIN — NEOSTIGMINE METHYLSULFATE 5 MG: 0.5 INJECTION INTRAVENOUS at 04:01

## 2022-01-11 RX ADMIN — LIDOCAINE HYDROCHLORIDE 100 MG: 20 INJECTION, SOLUTION EPIDURAL; INFILTRATION; INTRACAUDAL at 01:01

## 2022-01-11 RX ADMIN — PROPOFOL 160 MG: 10 INJECTION, EMULSION INTRAVENOUS at 01:01

## 2022-01-11 RX ADMIN — ACETAMINOPHEN 1000 MG: 500 TABLET ORAL at 11:01

## 2022-01-11 RX ADMIN — CEFAZOLIN SODIUM 2 G: 1 INJECTION, POWDER, FOR SOLUTION INTRAMUSCULAR; INTRAVENOUS at 01:01

## 2022-01-11 NOTE — ANESTHESIA PREPROCEDURE EVALUATION
01/11/2022  Cassandra Garcia is a 66 y.o., female.    Anesthesia Evaluation    I have reviewed the Patient Summary Reports.    I have reviewed the Nursing Notes.       Review of Systems  Anesthesia Hx:  No problems with previous Anesthesia    Hematology/Oncology:  Hematology Normal   Oncology Normal     EENT/Dental:EENT/Dental Normal   Cardiovascular:   Hypertension    Pulmonary:  Pulmonary Normal    Renal/:  Renal/ Normal     Hepatic/GI:  Hepatic/GI Normal    Musculoskeletal:  Musculoskeletal Normal    Neurological:  Neurology Normal    Endocrine:   Diabetes    Dermatological:  Skin Normal    Psych:  Psychiatric Normal           Physical Exam  General:  Well nourished    Airway/Jaw/Neck:  Airway Findings: Mouth Opening: Normal Tongue: Normal  General Airway Assessment: Adult  Mallampati: II  TM Distance: Normal, at least 6 cm        Eyes/Ears/Nose:  EYES/EARS/NOSE FINDINGS: Normal   Dental:  Dental Findings: In tact   Chest/Lungs:  Chest/Lungs Clear    Heart/Vascular:  Heart Findings: Normal Heart murmur: negative Vascular Findings: Normal    Abdomen:  Abdomen Findings: Normal    Musculoskeletal:  Musculoskeletal Findings: Normal   Skin:  Skin Findings: Normal    Mental Status:  Mental Status Findings: Normal        Anesthesia Plan  Type of Anesthesia, risks & benefits discussed:  Anesthesia Type:  general    Patient's Preference:   Plan Factors:          Intra-op Monitoring Plan:   Intra-op Monitoring Plan Comments:   Post Op Pain Control Plan:   Post Op Pain Control Plan Comments:     Induction:   IV  Beta Blocker:  Patient is not currently on a Beta-Blocker (No further documentation required).       Informed Consent: Patient understands risks and agrees with Anesthesia plan.  Questions answered. Anesthesia consent signed with patient.  ASA Score: 3     Day of Surgery Review of History & Physical:    H&P  update referred to the surgeon.         Ready For Surgery From Anesthesia Perspective.

## 2022-01-11 NOTE — ANESTHESIA PROCEDURE NOTES
Intubation    Date/Time: 1/11/2022 1:42 PM  Performed by: Shelley Flores CRNA  Authorized by: Floyd Mark Jr., MD     Intubation:     Induction:  Intravenous    Intubated:  Postinduction    Mask Ventilation:  Easy mask    Attempts:  1    Attempted By:  CRNA    Method of Intubation:  Direct    Blade:  Pan 2    Laryngeal View Grade: Grade I - full view of cords      Difficult Airway Encountered?: No      Complications:  None    Airway Device:  Oral endotracheal tube    Airway Device Size:  7.0    Style/Cuff Inflation:  Cuffed (inflated to minimal occlusive pressure)    Inflation Amount (mL):  4    Tube secured:  22    Secured at:  The lips    Placement Verified By:  Capnometry    Complicating Factors:  None    Findings Post-Intubation:  BS equal bilateral and atraumatic/condition of teeth unchanged

## 2022-01-11 NOTE — OP NOTE
[facility]  Operative Note    Date: 01/11/2022    Pre-Op Diagnosis:   Grade II cystocele  Vaginal Vault prolapse    Post-Op Diagnosis: same    Procedure(s) Performed:   Sacrospinous ligament fixation  Anterior colporrhaphy     Specimen(s): Vaginal epithelium    Staff Surgeon: Simi Mckeon MD    Assistant Surgeon: Cas Boland MD    Anesthesia: General endotracheal anesthesia    Indications: Cassandra Garcia is a 66 y.o. female with vaginal vault prolapse and Grade II cystocele. She presents today for repair.    Findings:   Reduction of cystocele with anterior repair  Reduction of apical prolapse with sacrospinous ligament fixation  Excellent support at the conclusion of the surgery  Bilateral ureteral efflux noted    Estimated Blood Loss: minimal    Drains: 16 Latvian gil    Procedure in detail:  After the risks and benefits of the procedure were explained, consent was obtained. The patient was taken to the operating room and placed under general anesthesia. Pre-operative antibiotics were administered. She was placed in dorsal lithotomy position with appropriate pressure points padded.    The apex was identified and two 2-0 silk sutures were placed on each fornix of the apex. A an allis clamp was placed on the vaginal epithelium between the urethra and the cystocele. Hydro-dissection was then performed on the anterior vaginal epithelium with 1%lidocaine and 0.25% marcaine of equal parts.  An incision was made with a 15 blade extending along the vaginal epithelium over cystocele. Dissection was then begun using tenotomy scissors in the plane between the pubocervical fascia and the vaginal epithelium. This dissection was performed circumferentially and taken down to the apex on both sides.     At this point we then entered the endopelvic fascia on the right side and palpated for the right Iliac spine. The ileococcygeus could be palpated medial and caudal to the spine. The sacrospinous ligament could  be palpated medial to the spine. A capio needle was then used to enter the sacrospinous ligament with 2-0 Ethibond suture.      The cystocele was then reduced and the pubocervical fascia was brought together using 3-0 vicryl sutures in a simple interrupted fashion.     Once the cystocele was adequately reduced, the uterosacral ligaments on the underside of the vaginal epithelium was identified on each side. The other end of the capio suture was then taken the uterosacrals using the 2-0 Ethibond suture and 3 half hitches. This was repeated on the contralateral side.     The most posterior vaginal epithelium was closed with 2-0 vicryl in a vertical mattress fashion.     The capio suture was then pulleyed back to the sacrospinous. bringing the apex back to the sacrospinous. This was then tied down.    Indigo carmine had been administered 10 minutes previously.  A cystoscopy was then performed.    There was noted to be brisk efflux from the right ureteral orifice, there was minimal efflux from the left ureteral orifice and appeared to be some kinking of the ureter.    A distal vicryl suture was removed from the pubo-cervical fascia. A repeat cystoscopy showed the left UO showed excellent efflux and there did not appear to be kinking of the ureter.     There was brisk blue efflux from both the right and left ureteral orifices, which were in the normal anatomic position. Her bladder appeared normal.  The cystocele was reduced as evidenced on cystoscopy. The urethra was examined. The cystoscope was then removed.     The gil was replaced in the bladder.    The capio sutures were then cut and the remaining overlying vaginal epithelium was closed with interrupted vertical mattress sutures.     A vaginal pack was placed. The patient tolerated the procedure well and was transferred back to recovery in stable condition.     Plan:  1. Place in observation  2. D/c gil, vaginal packing and voiding trial in [facility],  MD WALTON was present for the entire case and agree with the above note.

## 2022-01-11 NOTE — H&P
CHIEF COMPLAINT:    Mrs. Garcia is a 66 y.o. female presenting for a consultation at the request of Dr. George. Patient presents with prolapsed bladder.    PRESENTING ILLNESS:    Cassandra Garcia is a 66 y.o. female who states that she has a several year history of prolapse bladder.  She states that when she is walking around it hangs be on the lips of her vagina.  She uses a panty liner and has to change it 4 times a day.  And because she doubles them up to increase the length, she ends up going through 8 total.  She feels likes she leaks urine but can not feel when it is happening.  She does not need to splint to urinate or have a bowel movement.  When she urinates she has to change posture over the toilet in order to empty.  She has frequency times 3-4 nocturia times 0-1.    Of note she also has a history of incisional hernia and had to have emergency surgery because of an incarcerated bowel and then later underwent laparoscopic mesh placement over the incisional hernia.  And this is located on her abdomen.    , hysterectomy in  for fibroids.  She is sexually active and has no pain.  And has no bowel issues.    REVIEW OF SYSTEMS:    Review of Systems   Constitutional: Negative.    HENT: Negative.    Eyes: Negative.    Respiratory: Negative.    Cardiovascular: Negative.    Gastrointestinal: Negative.    Genitourinary:        Hesitancy   Musculoskeletal: Negative.    Skin: Negative.    Neurological: Negative.    Endo/Heme/Allergies: Negative.    Psychiatric/Behavioral: Negative.        PATIENT HISTORY:    Past Medical History:   Diagnosis Date    Anticoagulant long-term use     Diabetes mellitus     type II dm    Hypertension        Past Surgical History:   Procedure Laterality Date    HERNIA REPAIR      HYSTERECTOMY      TONSILLECTOMY      VENTRAL HERNIA REPAIR  09/10/2017       History reviewed. No pertinent family history.    Socioeconomic History    Marital status:    Tobacco Use    Smoking  status: Never Smoker    Smokeless tobacco: Never Used   Substance and Sexual Activity    Alcohol use: No    Drug use: Never    Sexual activity: Never     Partners: Male       Allergies:  Patient has no known allergies.    Medications:  Outpatient Encounter Medications as of 6/11/2021   Medication Sig Dispense Refill    atorvastatin (LIPITOR) 10 MG tablet Take 10 mg by mouth once daily.      dextroamphetamine-amphetamine (ADDERALL XR) 30 MG 24 hr capsule Take 30 mg by mouth every morning.      fluconazole (DIFLUCAN) 150 MG Tab       fluticasone (FLONASE) 50 mcg/actuation nasal spray 1 spray (50 mcg total) by Each Nare route 2 (two) times daily as needed for Rhinitis. 15 g 0    FLUZONE HIGHDOSE QUAD 20-21  mcg/0.7 mL Syrg PHARMACIST ADMINISTERED IMMUNIZATION ADMINISTERED AT TIME OF DISPENSING      ibuprofen (ADVIL,MOTRIN) 800 MG tablet       lisinopril (PRINIVIL,ZESTRIL) 2.5 MG tablet Take 1 tablet by mouth once daily.      metFORMIN (GLUCOPHAGE) 500 MG tablet Take 1 tablet (500 mg total) by mouth 2 (two) times daily with meals. 60 tablet 0    ondansetron (ZOFRAN) 4 MG tablet       OZEMPIC 0.25 mg or 0.5 mg(2 mg/1.5 mL) PnIj       TRADJENTA 5 mg Tab tablet       traMADol (ULTRAM) 50 mg tablet Take 1 tablet (50 mg total) by mouth every 6 (six) hours as needed for Pain. 12 tablet 0    traZODone (DESYREL) 50 MG tablet Take 50 mg by mouth nightly.      glimepiride (AMARYL) 2 MG tablet Take 1 tablet (2 mg total) by mouth before breakfast. for 7 days 7 tablet 0     No facility-administered encounter medications on file as of 6/11/2021.         PHYSICAL EXAMINATION:    The patient generally appears in good health, is appropriately interactive, and is in no apparent distress.    Skin: No lesions.    Mental: Cooperative with normal affect.    Neuro: Grossly intact.    HEENT: Normal. No evidence of lymphadenopathy.    Chest:  normal inspiratory effort.    Abdomen:  Soft, non-tender. No masses or organomegaly. Bladder is  not palpable. No evidence of flank discomfort. No evidence of inguinal hernia.    Extremities: No clubbing, cyanosis, or edema    Normal external female genitalia  Urethral meatus is normal  Urethra and bladder are nontender to bimanual exam  Grade II cystocele.    Small rectocele   Uterus and cervix are surgically absent.  The cuff is at the hymenal ring.    No adnexal masses    Aa +1, Ba 0, C 0  gH 5, pB 2, TVL 8.5  Ap -2, Bp -2, D n/a    LABS:    Lab Results   Component Value Date    BUN 24 (H) 01/06/2022    CREATININE 0.9 01/06/2022     UA specific gravity 1.020, negative for all other components.    IMPRESSION:    Encounter Diagnoses   Name Primary?    Midline cystocele Yes    Vaginal vault prolapse        PLAN:    Plan for anterior repair, sacrospinous ligament fixation, consents signed, risks and benefits reviewed.    Patient seen in holding.  No changes in clinical condition.  Proceed with planned procedure.

## 2022-01-11 NOTE — TRANSFER OF CARE
"Anesthesia Transfer of Care Note    Patient: Cassandra Garcia    Procedure(s) Performed: Procedure(s) (LRB):  FIXATION, LIGAMENT, SACROSPINOUS (N/A)  COLPORRHAPHY, ANTERIOR (N/A)  CYSTOSCOPY (N/A)    Patient location: PACU    Anesthesia Type: general    Transport from OR: Transported from OR on 6-10 L/min O2 by face mask with adequate spontaneous ventilation    Post pain: adequate analgesia    Post assessment: tolerated procedure well and no apparent anesthetic complications    Post vital signs: stable    Level of consciousness: awake, alert and oriented    Nausea/Vomiting: no nausea/vomiting    Complications: none    Transfer of care protocol was followed      Last vitals:   Visit Vitals  BP (!) 156/84 (BP Location: Left arm, Patient Position: Lying)   Pulse 80   Temp 36.4 °C (97.5 °F) (Oral)   Resp 16   Ht 5' 4" (1.626 m)   Wt 70.3 kg (154 lb 15.7 oz)   LMP  (LMP Unknown)   SpO2 99%   Breastfeeding No   BMI 26.60 kg/m²     "

## 2022-01-12 VITALS
RESPIRATION RATE: 18 BRPM | OXYGEN SATURATION: 93 % | SYSTOLIC BLOOD PRESSURE: 98 MMHG | WEIGHT: 155 LBS | BODY MASS INDEX: 26.46 KG/M2 | HEART RATE: 79 BPM | HEIGHT: 64 IN | DIASTOLIC BLOOD PRESSURE: 48 MMHG | TEMPERATURE: 97 F

## 2022-01-12 PROBLEM — N81.9 VAGINAL VAULT PROLAPSE: Status: RESOLVED | Noted: 2021-06-11 | Resolved: 2022-01-12

## 2022-01-12 PROBLEM — N81.10 VAGINAL PROLAPSE: Status: ACTIVE | Noted: 2022-01-12

## 2022-01-12 LAB
ANION GAP SERPL CALC-SCNC: 10 MMOL/L (ref 8–16)
BASOPHILS # BLD AUTO: 0.01 K/UL (ref 0–0.2)
BASOPHILS NFR BLD: 0.1 % (ref 0–1.9)
BUN SERPL-MCNC: 13 MG/DL (ref 8–23)
CALCIUM SERPL-MCNC: 9.4 MG/DL (ref 8.7–10.5)
CHLORIDE SERPL-SCNC: 106 MMOL/L (ref 95–110)
CO2 SERPL-SCNC: 22 MMOL/L (ref 23–29)
CREAT SERPL-MCNC: 0.7 MG/DL (ref 0.5–1.4)
DIFFERENTIAL METHOD: ABNORMAL
EOSINOPHIL # BLD AUTO: 0 K/UL (ref 0–0.5)
EOSINOPHIL NFR BLD: 0 % (ref 0–8)
ERYTHROCYTE [DISTWIDTH] IN BLOOD BY AUTOMATED COUNT: 12.7 % (ref 11.5–14.5)
EST. GFR  (AFRICAN AMERICAN): >60 ML/MIN/1.73 M^2
EST. GFR  (NON AFRICAN AMERICAN): >60 ML/MIN/1.73 M^2
GLUCOSE SERPL-MCNC: 130 MG/DL (ref 70–110)
HCT VFR BLD AUTO: 34.7 % (ref 37–48.5)
HGB BLD-MCNC: 10.9 G/DL (ref 12–16)
IMM GRANULOCYTES # BLD AUTO: 0.04 K/UL (ref 0–0.04)
IMM GRANULOCYTES NFR BLD AUTO: 0.3 % (ref 0–0.5)
LYMPHOCYTES # BLD AUTO: 1.2 K/UL (ref 1–4.8)
LYMPHOCYTES NFR BLD: 10.4 % (ref 18–48)
MCH RBC QN AUTO: 29.5 PG (ref 27–31)
MCHC RBC AUTO-ENTMCNC: 31.4 G/DL (ref 32–36)
MCV RBC AUTO: 94 FL (ref 82–98)
MONOCYTES # BLD AUTO: 0.5 K/UL (ref 0.3–1)
MONOCYTES NFR BLD: 4.4 % (ref 4–15)
NEUTROPHILS # BLD AUTO: 10 K/UL (ref 1.8–7.7)
NEUTROPHILS NFR BLD: 84.8 % (ref 38–73)
NRBC BLD-RTO: 0 /100 WBC
PLATELET # BLD AUTO: 206 K/UL (ref 150–450)
PMV BLD AUTO: 10.4 FL (ref 9.2–12.9)
POCT GLUCOSE: 167 MG/DL (ref 70–110)
POCT GLUCOSE: 235 MG/DL (ref 70–110)
POTASSIUM SERPL-SCNC: 4.1 MMOL/L (ref 3.5–5.1)
RBC # BLD AUTO: 3.69 M/UL (ref 4–5.4)
SODIUM SERPL-SCNC: 138 MMOL/L (ref 136–145)
WBC # BLD AUTO: 11.82 K/UL (ref 3.9–12.7)

## 2022-01-12 PROCEDURE — 25000242 PHARM REV CODE 250 ALT 637 W/ HCPCS: Performed by: STUDENT IN AN ORGANIZED HEALTH CARE EDUCATION/TRAINING PROGRAM

## 2022-01-12 PROCEDURE — 25000003 PHARM REV CODE 250: Performed by: STUDENT IN AN ORGANIZED HEALTH CARE EDUCATION/TRAINING PROGRAM

## 2022-01-12 PROCEDURE — 80048 BASIC METABOLIC PNL TOTAL CA: CPT | Performed by: STUDENT IN AN ORGANIZED HEALTH CARE EDUCATION/TRAINING PROGRAM

## 2022-01-12 PROCEDURE — 36415 COLL VENOUS BLD VENIPUNCTURE: CPT | Performed by: STUDENT IN AN ORGANIZED HEALTH CARE EDUCATION/TRAINING PROGRAM

## 2022-01-12 PROCEDURE — 85025 COMPLETE CBC W/AUTO DIFF WBC: CPT | Performed by: STUDENT IN AN ORGANIZED HEALTH CARE EDUCATION/TRAINING PROGRAM

## 2022-01-12 PROCEDURE — 63600175 PHARM REV CODE 636 W HCPCS: Performed by: STUDENT IN AN ORGANIZED HEALTH CARE EDUCATION/TRAINING PROGRAM

## 2022-01-12 RX ORDER — METHOCARBAMOL 500 MG/1
1000 TABLET, FILM COATED ORAL 4 TIMES DAILY PRN
Status: DISCONTINUED | OUTPATIENT
Start: 2022-01-12 | End: 2022-01-12 | Stop reason: HOSPADM

## 2022-01-12 RX ORDER — CEPHALEXIN 500 MG/1
500 CAPSULE ORAL 4 TIMES DAILY
Qty: 12 CAPSULE | Refills: 0 | Status: SHIPPED | OUTPATIENT
Start: 2022-01-12 | End: 2022-01-15

## 2022-01-12 RX ORDER — OXYCODONE AND ACETAMINOPHEN 5; 325 MG/1; MG/1
1 TABLET ORAL EVERY 6 HOURS PRN
Qty: 20 TABLET | Refills: 0 | Status: SHIPPED | OUTPATIENT
Start: 2022-01-12

## 2022-01-12 RX ORDER — POLYETHYLENE GLYCOL 3350 17 G/17G
17 POWDER, FOR SOLUTION ORAL DAILY
Qty: 238 G | Refills: 0 | Status: SHIPPED | OUTPATIENT
Start: 2022-01-12 | End: 2022-01-26

## 2022-01-12 RX ADMIN — METHOCARBAMOL 1000 MG: 500 TABLET ORAL at 04:01

## 2022-01-12 RX ADMIN — INSULIN ASPART 2 UNITS: 100 INJECTION, SOLUTION INTRAVENOUS; SUBCUTANEOUS at 09:01

## 2022-01-12 RX ADMIN — ACETAMINOPHEN 1000 MG: 500 TABLET ORAL at 05:01

## 2022-01-12 RX ADMIN — FLUTICASONE PROPIONATE 50 MCG: 50 SPRAY, METERED NASAL at 09:01

## 2022-01-12 RX ADMIN — ATORVASTATIN CALCIUM 10 MG: 10 TABLET, FILM COATED ORAL at 08:01

## 2022-01-12 RX ADMIN — OXYCODONE 5 MG: 5 TABLET ORAL at 08:01

## 2022-01-12 RX ADMIN — ACETAMINOPHEN 1000 MG: 500 TABLET ORAL at 12:01

## 2022-01-12 RX ADMIN — OXYCODONE 5 MG: 5 TABLET ORAL at 01:01

## 2022-01-12 RX ADMIN — TRAMADOL HYDROCHLORIDE 50 MG: 50 TABLET, FILM COATED ORAL at 03:01

## 2022-01-12 RX ADMIN — LISINOPRIL 2.5 MG: 2.5 TABLET ORAL at 08:01

## 2022-01-12 NOTE — NURSING TRANSFER
Nursing Transfer Note      1/11/2022         Transfer To: 541    Transfer via stretcher    Transfer with none    Transported by PACU PCT    Medicines sent: none    Any special needs or follow-up needed: none    Chart send with patient: Yes    Notified: family    Patient reassessed at: 01/11@2010    Upon arrival to floor: The patient was transferred to Rm 541 via stretcher. Personal belongings with patient . Report given to HANNAH Fox.

## 2022-01-12 NOTE — PLAN OF CARE
Pt medicated with Methorcarbamol 1000 mg x 1 for muscle spasms. Plan of care reviewed with patient and question answered. Urine output for shift 2120 ml of clear blue urine. Pt AAO X 4. Q2 hour rounding done and safety maintained. No acute events during shift. Will continue to monitor.

## 2022-01-12 NOTE — DISCHARGE INSTRUCTIONS
Post Op Instructions for Pelvic Organ Prolapse Surgery    ACTIVITY   The first six weeks is a critical time period for wound healing.  We depend on the tissue scarring to provide the necessary vaginal support.     After receiving an anesthetic you may feel sleepy or nauseated.  It is best to have little activity for the first 24-48 hours.  Gradually increase activity.   No heavy lifting (nothing greater than 10 pounds or a gallon of milk) for 6 weeks.   Pelvic rest (no sexual intercourse, douching or tampons) for 6 weeks.   It is OK to walk around the house.  Avoid riding a bicycle or putting similar pressure over this area.  No strenuous workouts.     Do not drive for the first 48 hours or if you are taking narcotic pain medication.  Before driving, be sure you can press the brakes without difficulty or pain.    WOUND CARE INSTRUCTIONS   You may have small incisions that were closed with a medical grade superglue.  You may wash these and pat them dry after 24 hours   Do not immerse yourself in water, (i.e. no tub baths, swimming or hot tubbing) for 6 weeks.  You may shower.     It is normal to have some light bleeding which should gradually resolve over the next week.  This will look like a light period.    DIET   In the first 24 hours, it is common to experience some nausea, so take clear liquids.  Once the nausea has resolved, a soft diet is recommended with gradual increase to your normal diet.    MEDICATIONS   Pain medication should be taken on an as needed basis.  If the narcotic is too much, over-the-counter pain relievers may be taken.  However, do not take additional Tylenol/acetaminophen while taking narcotic pain medication as this can lead to an overdose of the Tylenol/acetaminophen.   Antibiotics, if ordered, should be taken as directed until the prescription has been completed.     A stool softener (Colace or docusate sodium) is recommended to maintain soft stools after surgery.   If you  do not have a bowel movement within 36-48 hours of surgery, take 2 tablespoons of Milk of Magnesia.  If there is still no bowel movement by the next day, take ½ bottle of Magnesium Citrate (refrigerate and drink with a straw.)    WHEN TO CALL THE DOCTOR:   For heavy bleeding (vaginal discharge like a light period is expected for the 3rd week)   Redness or swelling around the incision   Fever over 101oF (38.5oC)   Significant vaginal drainage    Persistent pain unrelieved by the pain medication   Leg swelling   Shortness of breath   Burning with urination   Inability to urinate   Abdominal pain not improving day by day       FOR EMERGENCIES   If any unusual problems, questions or concerns, please contact your physician or the resident on call at [phone] after hours or during office hours, [phone]

## 2022-01-12 NOTE — PLAN OF CARE
Problem: Adult Inpatient Plan of Care  Goal: Plan of Care Review  1/12/2022 1213 by Dasha Shore RN  Outcome: Adequate for Care Transition  1/12/2022 1020 by Dasha Shore RN  Outcome: Ongoing, Progressing  Goal: Patient-Specific Goal (Individualized)  1/12/2022 1213 by Dasha Shore RN  Outcome: Adequate for Care Transition  1/12/2022 1020 by Dasha Shore RN  Outcome: Ongoing, Progressing  Goal: Absence of Hospital-Acquired Illness or Injury  1/12/2022 1213 by Dasha Shore RN  Outcome: Adequate for Care Transition  1/12/2022 1020 by Dasha Shore RN  Outcome: Ongoing, Progressing  Goal: Optimal Comfort and Wellbeing  1/12/2022 1213 by Dasha Shore RN  Outcome: Adequate for Care Transition  1/12/2022 1020 by Dasha Shore RN  Outcome: Ongoing, Progressing  Goal: Readiness for Transition of Care  1/12/2022 1213 by Dasha Shore RN  Outcome: Adequate for Care Transition  1/12/2022 1020 by Dasha Shore RN  Outcome: Ongoing, Progressing     Problem: Infection  Goal: Absence of Infection Signs and Symptoms  1/12/2022 1213 by Dasha Shore RN  Outcome: Adequate for Care Transition  1/12/2022 1020 by Dasha Shore RN  Outcome: Ongoing, Progressing     Problem: Pain Acute  Goal: Acceptable Pain Control and Functional Ability  1/12/2022 1213 by Dasha Shore RN  Outcome: Adequate for Care Transition  1/12/2022 1020 by Dasha Shore RN  Outcome: Ongoing, Progressing

## 2022-01-12 NOTE — PLAN OF CARE
Problem: Adult Inpatient Plan of Care  Goal: Plan of Care Review  Outcome: Ongoing, Progressing  Goal: Patient-Specific Goal (Individualized)  Outcome: Ongoing, Progressing  Goal: Absence of Hospital-Acquired Illness or Injury  Outcome: Ongoing, Progressing  Goal: Optimal Comfort and Wellbeing  Outcome: Ongoing, Progressing  Goal: Readiness for Transition of Care  Outcome: Ongoing, Progressing     Problem: Infection  Goal: Absence of Infection Signs and Symptoms  Outcome: Ongoing, Progressing     Problem: Pain Acute  Goal: Acceptable Pain Control and Functional Ability  Outcome: Ongoing, Progressing

## 2022-01-12 NOTE — DISCHARGE SUMMARY
[facility] - Surgery  Urology  Discharge Summary      Patient Name: Cassandra Garcia  MRN: [id number]  Admission Date: 1/11/2022  Hospital Length of Stay: 0 days  Discharge Date and Time: 1/12/2022  1:02 PM  Attending Physician: Simi Mkceon MD  Discharging Provider: Iglesia Ornelas MD  Primary Care Physician: Kajal Castle MD    HPI:   Cassandra Garcia is a 65yo who presented for repair of Grade II cystocele and Vaginal Vault prolapse and is now s/p sacrospinous ligament fixation and anterior colporrhaphy.       Procedure(s) (LRB):  FIXATION, LIGAMENT, SACROSPINOUS (N/A)  COLPORRHAPHY, ANTERIOR (N/A)  CYSTOSCOPY (N/A)     Indwelling Lines/Drains at time of discharge:   Lines/Drains/Airways       Drain                   Urethral Catheter 01/11/22 Silicone;Straight-tip 16 Fr. 1 day                    Hospital Course   Patient was brought to the hospital for the above procedure.  The patient tolerated it well.  Post op, the patient's diet was advanced, their pain was controlled, and the patient was ambulating without problem.  They passed a voiding trial and their igl and vaginal packing were removed.  The patient will follow up in 2 weeks with Dr. Mckeon.  The patient was given prescriptions to be delivered at bedside prior to d/c.    Medications and instructions as below.  For more thorough information, please refer to the hospital record and operative report.    Physical Exam at D/C  General: No acute distress, well developed. AAOx3  Head: Normocephalic, Atraumatic  Eyes: Extra-occular movements intact, No discharge  Neck: supple, symmetrical, trachea midline  Lungs: normal respiratory effort, no respiratory distress, no wheezes  CV: regular rate, 2+ pulses  Abdomen: soft, non-tender, non-distended, no organomegaly  MSK: no edema, no deformities, normal ROM  Skin: skin color, texture, turgor normal.  Neurologic: no focal deficits, sensation intact    Consults:  none    Significant Diagnostic Studies:     Pending  Diagnostic Studies:       Procedure Component Value Units Date/Time    Specimen to Pathology, Surgery Urology [[id number]] Collected: 01/11/22 1615    Order Status: Sent Lab Status: In process Updated: 01/12/22 0028            Final Active Diagnoses:    Diagnosis Date Noted POA    Vaginal prolapse [N81.10] 01/12/2022 Yes      Problems Resolved During this Admission:    Diagnosis Date Noted Date Resolved POA    PRINCIPAL PROBLEM:  Vaginal vault prolapse [N81.9] 06/11/2021 01/12/2022 Yes       Discharged Condition: good    Disposition:     Follow Up:   Follow-up Information       Simi Mckeon MD In 2 weeks.    Specialty: Urology  Why: Post-op, For wound re-check  Contact information:  [address] [place] [address] [id number] [phone]                           Patient Instructions:      Diet Adult Regular     No dressing needed     Notify your health care provider if you experience any of the following:  temperature >100.4     Notify your health care provider if you experience any of the following:  persistent nausea and vomiting or diarrhea     Notify your health care provider if you experience any of the following:  severe uncontrolled pain     Notify your health care provider if you experience any of the following:  difficulty breathing or increased cough     Notify your health care provider if you experience any of the following:  severe persistent headache     Notify your health care provider if you experience any of the following:  worsening rash     Notify your health care provider if you experience any of the following:  persistent dizziness, light-headedness, or visual disturbances     Activity as tolerated     Medications:  Reconciled Home Medications:      Medication List        START taking these medications      cephALEXin 500 MG capsule  Commonly known as: KEFLEX  Take 1 capsule (500 mg total) by mouth 4 (four) times daily. for 3 days     oxyCODONE-acetaminophen 5-325 mg per tablet  Commonly known  as: PERCOCET  Take 1 tablet by mouth every 6 (six) hours as needed for Pain.     polyethylene glycol 17 gram/dose powder  Commonly known as: GLYCOLAX  Dissolve one capful (17g) by mouth once daily for 7 days.            CONTINUE taking these medications      atorvastatin 10 MG tablet  Commonly known as: LIPITOR  Take 10 mg by mouth once daily.     dextroamphetamine-amphetamine 30 MG 24 hr capsule  Commonly known as: ADDERALL XR  Take 30 mg by mouth every morning.     fluconazole 150 MG Tab  Commonly known as: DIFLUCAN  States takes as needed     fluticasone propionate 50 mcg/actuation nasal spray  Commonly known as: FLONASE  1 spray (50 mcg total) by Each Nare route 2 (two) times daily as needed for Rhinitis.     FLUZONE HIGHDOSE QUAD 20-21  mcg/0.7 mL Syrg  Generic drug: flu vacc [id number](65yr up)-PF  PHARMACIST ADMINISTERED IMMUNIZATION ADMINISTERED AT TIME OF DISPENSING     glimepiride 2 MG tablet  Commonly known as: AMARYL  Take 1 tablet (2 mg total) by mouth before breakfast. for 7 days     ibuprofen 800 MG tablet  Commonly known as: ADVIL,MOTRIN     lisinopriL 2.5 MG tablet  Commonly known as: PRINIVIL,ZESTRIL  Take 1 tablet by mouth once daily.     metFORMIN 500 MG tablet  Commonly known as: GLUCOPHAGE  Take 1 tablet (500 mg total) by mouth 2 (two) times daily with meals.     ondansetron 4 MG tablet  Commonly known as: ZOFRAN     TRADJENTA 5 mg Tab tablet  Generic drug: linaGLIPtin     traMADoL 50 mg tablet  Commonly known as: ULTRAM  Take 1 tablet (50 mg total) by mouth every 6 (six) hours as needed for Pain.     traZODone 50 MG tablet  Commonly known as: DESYREL  Take 50 mg by mouth nightly.              Time spent on the discharge of patient: 15 minutes    Iglesia Ornelas MD  Urology  [facility] - Surgery    As above

## 2022-01-12 NOTE — PROGRESS NOTES
[facility] - Surgery  Urology  Progress Note    Patient Name: Cassandra Garcia  MRN: [id number]  Admission Date: 1/11/2022  Hospital Length of Stay: 0 days  Code Status: Full Code   Attending Provider: Simi Mckeon MD   Primary Care Physician: Kajal Castle MD    Subjective:     HPI:  Cassandra Garcia is a 67yo who presented for repair of Grade II cystocele and Vaginal Vault prolapse and is now s/p sacrospinous ligament fixation and anterior colporrhaphy.        Interval History: NAEON. VSS. May and vaginal packing removed this AM. Tolerating diet and ambulating halls. Will d/c today pending successful voiding trial.      Objective:     Temp:  [96.3 °F (35.7 °C)-99 °F (37.2 °C)] 96.6 °F (35.9 °C)  Pulse:  [53-80] 79  Resp:  [11-25] 18  SpO2:  [92 %-100 %] 93 %  BP: ()/(47-77) 98/48     Body mass index is 26.6 kg/m².           Drains     Drain                 Urethral Catheter 01/11/22 Silicone;Straight-tip 16 Fr. 1 day                Physical Exam  Vitals and nursing note reviewed.   Constitutional:       General: She is not in acute distress.  HENT:      Head: Atraumatic.      Nose: Nose normal.   Eyes:      Extraocular Movements: Extraocular movements intact.   Cardiovascular:      Rate and Rhythm: Normal rate.   Pulmonary:      Effort: Pulmonary effort is normal.   Abdominal:      General: Abdomen is flat.      Tenderness: There is no right CVA tenderness or left CVA tenderness.   Genitourinary:     Comments: May and vaginal packing removed this AM. Vaginal incisions c/d/i  Musculoskeletal:         General: Normal range of motion.      Cervical back: Normal range of motion.   Neurological:      General: No focal deficit present.      Mental Status: She is alert. Mental status is at baseline.   Psychiatric:         Mood and Affect: Mood normal.         Behavior: Behavior normal.         Thought Content: Thought content normal.         Judgment: Judgment normal.         Significant Labs:    BMP:  Recent  Labs   Lab 01/06/22  1400 01/12/22  0419    138   K 4.1 4.1    106   CO2 24 22*   BUN 24* 13   CREATININE 0.9 0.7   CALCIUM 9.7 9.4       CBC:   Recent Labs   Lab 01/06/22  1400 01/12/22  0419   WBC  --  11.82   HGB 12.9 10.9*   HCT  --  34.7*   PLT  --  206       All pertinent labs results from the past 24 hours have been reviewed.    Significant Imaging:  All pertinent imaging results/findings from the past 24 hours have been reviewed.                  Assessment/Plan:     Vaginal prolapse  -Continue regular diet, OOB ambulating  -Voiding trial and then d/c if successful         VTE Risk Mitigation (From admission, onward)    None          Iglesia Ornelas MD  Urology  [facility] - Surgery

## 2022-01-12 NOTE — ANESTHESIA POSTPROCEDURE EVALUATION
Anesthesia Post Evaluation    Patient: Cassandra Garcia    Procedure(s) Performed: Procedure(s) (LRB):  FIXATION, LIGAMENT, SACROSPINOUS (N/A)  COLPORRHAPHY, ANTERIOR (N/A)  CYSTOSCOPY (N/A)    Final Anesthesia Type: general      Patient location during evaluation: PACU  Patient participation: Yes- Able to Participate  Level of consciousness: awake and alert  Post-procedure vital signs: reviewed and stable  Pain management: adequate  Airway patency: patent    PONV status at discharge: No PONV  Anesthetic complications: no      Cardiovascular status: blood pressure returned to baseline  Respiratory status: unassisted  Hydration status: euvolemic  Follow-up not needed.          Vitals Value Taken Time   /60 01/12/22 0439   Temp 35.7 °C (96.3 °F) 01/12/22 0439   Pulse 65 01/12/22 0439   Resp 18 01/12/22 0439   SpO2 98 % 01/12/22 0439         Event Time   Out of Recovery 17:15:00         Pain/Rome Score: Pain Rating Prior to Med Admin: 2 (1/12/2022  5:18 AM)  Rome Score: 10 (1/11/2022  8:17 PM)

## 2022-01-12 NOTE — SUBJECTIVE & OBJECTIVE
Interval History: NAEON. VSS. May and vaginal packing removed this AM. Tolerating diet and ambulating halls. Will d/c today pending successful voiding trial.      Objective:     Temp:  [96.3 °F (35.7 °C)-99 °F (37.2 °C)] 96.6 °F (35.9 °C)  Pulse:  [53-80] 79  Resp:  [11-25] 18  SpO2:  [92 %-100 %] 93 %  BP: ()/(47-77) 98/48     Body mass index is 26.6 kg/m².           Drains     Drain                 Urethral Catheter 01/11/22 Silicone;Straight-tip 16 Fr. 1 day                Physical Exam  Vitals and nursing note reviewed.   Constitutional:       General: She is not in acute distress.  HENT:      Head: Atraumatic.      Nose: Nose normal.   Eyes:      Extraocular Movements: Extraocular movements intact.   Cardiovascular:      Rate and Rhythm: Normal rate.   Pulmonary:      Effort: Pulmonary effort is normal.   Abdominal:      General: Abdomen is flat.      Tenderness: There is no right CVA tenderness or left CVA tenderness.   Genitourinary:     Comments: May and vaginal packing removed this AM. Vaginal incisions c/d/i  Musculoskeletal:         General: Normal range of motion.      Cervical back: Normal range of motion.   Neurological:      General: No focal deficit present.      Mental Status: She is alert. Mental status is at baseline.   Psychiatric:         Mood and Affect: Mood normal.         Behavior: Behavior normal.         Thought Content: Thought content normal.         Judgment: Judgment normal.         Significant Labs:    BMP:  Recent Labs   Lab 01/06/22  1400 01/12/22  0419    138   K 4.1 4.1    106   CO2 24 22*   BUN 24* 13   CREATININE 0.9 0.7   CALCIUM 9.7 9.4       CBC:   Recent Labs   Lab 01/06/22  1400 01/12/22  0419   WBC  --  11.82   HGB 12.9 10.9*   HCT  --  34.7*   PLT  --  206       All pertinent labs results from the past 24 hours have been reviewed.    Significant Imaging:  All pertinent imaging results/findings from the past 24 hours have been reviewed.

## 2022-01-12 NOTE — HPI
Cassandra Garcia is a 65yo who presented for repair of Grade II cystocele and Vaginal Vault prolapse and is now s/p sacrospinous ligament fixation and anterior colporrhaphy.

## 2022-01-12 NOTE — PROGRESS NOTES
Pt arrive to floor via stretcher escorted by staff with gil draining clear blue urine. Pt AAO x 4. Call light within reach. Bed in lowest position.

## 2022-01-13 NOTE — PLAN OF CARE
[facility] - Surgery  Discharge Final Note    Primary Care Provider: Kajal Castle MD    Expected Discharge Date: 1/12/2022    Final Discharge Note (most recent)     Final Note - 01/12/22 1302        Final Note    Anticipated Discharge Disposition Home or Self Care     What phone number can be called within the next 1-3 days to see how you are doing after discharge? [phone]     Hospital Resources/Appts/Education Provided Provided patient/caregiver with written discharge plan information;Appointments scheduled by Navigator/Coordinator                 Future Appointments   Date Time Provider Department Center   1/28/2022 11:20 AM Simi Mckeon MD [facility]OLOGY [facility]   2/25/2022  1:40 PM Simi Mckeon MD [facility]OLOGY [facility]            Contact Info     Simi Mckeon MD   Specialty: Urology    [address]  [facility] [address]   Phone: [phone]       Next Steps: Follow up in 2 week(s)    Instructions: Post-op, For wound re-check

## 2022-01-19 LAB
FINAL PATHOLOGIC DIAGNOSIS: NORMAL
GROSS: NORMAL
Lab: NORMAL

## 2022-01-28 ENCOUNTER — OFFICE VISIT (OUTPATIENT)
Dept: UROLOGY | Facility: CLINIC | Age: 67
End: 2022-01-28
Payer: MEDICARE

## 2022-01-28 DIAGNOSIS — Z98.890 POST-OPERATIVE STATE: Primary | ICD-10-CM

## 2022-01-28 PROCEDURE — 4010F PR ACE/ARB THEARPY RXD/TAKEN: ICD-10-PCS | Mod: CPTII,95,, | Performed by: UROLOGY

## 2022-01-28 PROCEDURE — 99024 POSTOP FOLLOW-UP VISIT: CPT | Mod: 95,,, | Performed by: UROLOGY

## 2022-01-28 PROCEDURE — 3051F HG A1C>EQUAL 7.0%<8.0%: CPT | Mod: CPTII,95,, | Performed by: UROLOGY

## 2022-01-28 PROCEDURE — 4010F ACE/ARB THERAPY RXD/TAKEN: CPT | Mod: CPTII,95,, | Performed by: UROLOGY

## 2022-01-28 PROCEDURE — 3051F PR MOST RECENT HEMOGLOBIN A1C LEVEL 7.0 - < 8.0%: ICD-10-PCS | Mod: CPTII,95,, | Performed by: UROLOGY

## 2022-01-28 PROCEDURE — 99024 PR POST-OP FOLLOW-UP VISIT: ICD-10-PCS | Mod: 95,,, | Performed by: UROLOGY

## 2022-01-28 NOTE — PROGRESS NOTES
The patient location is:  [place], [place]  The chief complaint leading to consultation is: post op follow up     Visit type: audio only    Time with patient:  7 minutes of total time spent on the encounter, which includes face to face time and non-face to face time preparing to see the patient (eg, review of tests), obtaining and/or reviewing separately obtained history, documenting clinical information in the electronic or other health record, independently interpreting results (not separately reported) and communicating results to the patient/family/caregiver, or care coordination (not separately reported).     Each patient to whom he or she provides medical services by telemedicine is:  (1) informed of the relationship between the physician and patient and the respective role of any other health care provider with respect to management of the patient; and (2) notified that he or she may decline to receive medical services by telemedicine and may withdraw from such care at any time.        CHIEF COMPLAINT:    Mrs. Garcia is a 66 y.o. female presenting for a follow up after anterior colporrhaphy SSL fixation on 1/11/2022.    PRESENTING ILLNESS:    Cassandra Garcia is a 66 y.o. female who states she is doing well.  No problems urinating or defecating.  She feels well.  The buttocks pain has improved. It was pretty stable for about 10 days but in the last 3 days it has improved.  Now she has twinges of discomfort.     Allergies:  Patient has no known allergies.    Medications:  Outpatient Encounter Medications as of 1/28/2022   Medication Sig Dispense Refill    atorvastatin (LIPITOR) 10 MG tablet Take 10 mg by mouth once daily.      dextroamphetamine-amphetamine (ADDERALL XR) 30 MG 24 hr capsule Take 30 mg by mouth every morning.      fluconazole (DIFLUCAN) 150 MG Tab States takes as needed      fluticasone (FLONASE) 50 mcg/actuation nasal spray 1 spray (50 mcg total) by Each Nare route 2 (two) times daily as needed  for Rhinitis. 15 g 0    FLUZONE HIGHDOSE QUAD 20-21  mcg/0.7 mL Syrg PHARMACIST ADMINISTERED IMMUNIZATION ADMINISTERED AT TIME OF DISPENSING      glimepiride (AMARYL) 2 MG tablet Take 1 tablet (2 mg total) by mouth before breakfast. for 7 days 7 tablet 0    ibuprofen (ADVIL,MOTRIN) 800 MG tablet       lisinopril (PRINIVIL,ZESTRIL) 2.5 MG tablet Take 1 tablet by mouth once daily.      metFORMIN (GLUCOPHAGE) 500 MG tablet Take 1 tablet (500 mg total) by mouth 2 (two) times daily with meals. 60 tablet 0    ondansetron (ZOFRAN) 4 MG tablet       oxyCODONE-acetaminophen (PERCOCET) 5-325 mg per tablet Take 1 tablet by mouth every 6 (six) hours as needed for Pain. 20 tablet 0    TRADJENTA 5 mg Tab tablet       traMADol (ULTRAM) 50 mg tablet Take 1 tablet (50 mg total) by mouth every 6 (six) hours as needed for Pain. 12 tablet 0    traZODone (DESYREL) 50 MG tablet Take 50 mg by mouth nightly.       No facility-administered encounter medications on file as of 1/28/2022.         PHYSICAL EXAMINATION:    The patient generally sounds in good health, is appropriately interactive, and is in no apparent distress.        LABS:    Lab Results   Component Value Date    BUN 13 01/12/2022    CREATININE 0.7 01/12/2022       IMPRESSION:    Encounter Diagnoses   Name Primary?    Post-operative state Yes       PLAN:    1.  Discussed that she may see the sutures closer to her 6 week appointment  2.  She had questions regarding activity.  OK to walk (saunter), hold stretches for now, no exercise bike until after 6 week appointment.  Keep bowels soft.  OK to push a bowel movement out, just not strain.

## 2022-02-28 ENCOUNTER — OFFICE VISIT (OUTPATIENT)
Dept: UROLOGY | Facility: CLINIC | Age: 67
End: 2022-02-28
Payer: MEDICARE

## 2022-02-28 VITALS
WEIGHT: 149.69 LBS | DIASTOLIC BLOOD PRESSURE: 79 MMHG | SYSTOLIC BLOOD PRESSURE: 137 MMHG | HEART RATE: 99 BPM | HEIGHT: 64 IN | BODY MASS INDEX: 25.56 KG/M2

## 2022-02-28 DIAGNOSIS — Z98.890 POST-OPERATIVE STATE: Primary | ICD-10-CM

## 2022-02-28 PROBLEM — N81.10 VAGINAL PROLAPSE: Status: RESOLVED | Noted: 2022-01-12 | Resolved: 2022-02-28

## 2022-02-28 PROCEDURE — 4010F PR ACE/ARB THEARPY RXD/TAKEN: ICD-10-PCS | Mod: CPTII,S$GLB,, | Performed by: UROLOGY

## 2022-02-28 PROCEDURE — 3075F SYST BP GE 130 - 139MM HG: CPT | Mod: CPTII,S$GLB,, | Performed by: UROLOGY

## 2022-02-28 PROCEDURE — 4010F ACE/ARB THERAPY RXD/TAKEN: CPT | Mod: CPTII,S$GLB,, | Performed by: UROLOGY

## 2022-02-28 PROCEDURE — 3078F PR MOST RECENT DIASTOLIC BLOOD PRESSURE < 80 MM HG: ICD-10-PCS | Mod: CPTII,S$GLB,, | Performed by: UROLOGY

## 2022-02-28 PROCEDURE — 1159F MED LIST DOCD IN RCRD: CPT | Mod: CPTII,S$GLB,, | Performed by: UROLOGY

## 2022-02-28 PROCEDURE — 1126F PR PAIN SEVERITY QUANTIFIED, NO PAIN PRESENT: ICD-10-PCS | Mod: CPTII,S$GLB,, | Performed by: UROLOGY

## 2022-02-28 PROCEDURE — 99024 PR POST-OP FOLLOW-UP VISIT: ICD-10-PCS | Mod: S$GLB,,, | Performed by: UROLOGY

## 2022-02-28 PROCEDURE — 3288F FALL RISK ASSESSMENT DOCD: CPT | Mod: CPTII,S$GLB,, | Performed by: UROLOGY

## 2022-02-28 PROCEDURE — 3075F PR MOST RECENT SYSTOLIC BLOOD PRESS GE 130-139MM HG: ICD-10-PCS | Mod: CPTII,S$GLB,, | Performed by: UROLOGY

## 2022-02-28 PROCEDURE — 3008F BODY MASS INDEX DOCD: CPT | Mod: CPTII,S$GLB,, | Performed by: UROLOGY

## 2022-02-28 PROCEDURE — 1159F PR MEDICATION LIST DOCUMENTED IN MEDICAL RECORD: ICD-10-PCS | Mod: CPTII,S$GLB,, | Performed by: UROLOGY

## 2022-02-28 PROCEDURE — 3051F PR MOST RECENT HEMOGLOBIN A1C LEVEL 7.0 - < 8.0%: ICD-10-PCS | Mod: CPTII,S$GLB,, | Performed by: UROLOGY

## 2022-02-28 PROCEDURE — 3051F HG A1C>EQUAL 7.0%<8.0%: CPT | Mod: CPTII,S$GLB,, | Performed by: UROLOGY

## 2022-02-28 PROCEDURE — 1101F PT FALLS ASSESS-DOCD LE1/YR: CPT | Mod: CPTII,S$GLB,, | Performed by: UROLOGY

## 2022-02-28 PROCEDURE — 99999 PR PBB SHADOW E&M-EST. PATIENT-LVL III: CPT | Mod: PBBFAC,,, | Performed by: UROLOGY

## 2022-02-28 PROCEDURE — 3008F PR BODY MASS INDEX (BMI) DOCUMENTED: ICD-10-PCS | Mod: CPTII,S$GLB,, | Performed by: UROLOGY

## 2022-02-28 PROCEDURE — 99024 POSTOP FOLLOW-UP VISIT: CPT | Mod: S$GLB,,, | Performed by: UROLOGY

## 2022-02-28 PROCEDURE — 3078F DIAST BP <80 MM HG: CPT | Mod: CPTII,S$GLB,, | Performed by: UROLOGY

## 2022-02-28 PROCEDURE — 1101F PR PT FALLS ASSESS DOC 0-1 FALLS W/OUT INJ PAST YR: ICD-10-PCS | Mod: CPTII,S$GLB,, | Performed by: UROLOGY

## 2022-02-28 PROCEDURE — 1126F AMNT PAIN NOTED NONE PRSNT: CPT | Mod: CPTII,S$GLB,, | Performed by: UROLOGY

## 2022-02-28 PROCEDURE — 99999 PR PBB SHADOW E&M-EST. PATIENT-LVL III: ICD-10-PCS | Mod: PBBFAC,,, | Performed by: UROLOGY

## 2022-02-28 PROCEDURE — 3288F PR FALLS RISK ASSESSMENT DOCUMENTED: ICD-10-PCS | Mod: CPTII,S$GLB,, | Performed by: UROLOGY

## 2022-02-28 NOTE — PROGRESS NOTES
CHIEF COMPLAINT:    Mrs. Garcia is a 66 y.o. female presenting for a post op exam following SSL fixation and anterior colporrhaphy on 1/11/2022    PRESENTING ILLNESS:    Cassandra Garcia is a 66 y.o. female who returns stating that she feels better.  Much more comfortable not feeling the bulging sensation.  She had an episode of choking and had a small amount of incontinence with that.  Otherwise, she is doing well.     Allergies:  Patient has no known allergies.    Medications:  Outpatient Encounter Medications as of 2/28/2022   Medication Sig Dispense Refill    atorvastatin (LIPITOR) 10 MG tablet Take 10 mg by mouth once daily.      dextroamphetamine-amphetamine (ADDERALL XR) 30 MG 24 hr capsule Take 30 mg by mouth every morning.      lisinopril (PRINIVIL,ZESTRIL) 2.5 MG tablet Take 1 tablet by mouth once daily.      metFORMIN (GLUCOPHAGE) 500 MG tablet Take 1 tablet (500 mg total) by mouth 2 (two) times daily with meals. 60 tablet 0    ondansetron (ZOFRAN) 4 MG tablet       TRADJENTA 5 mg Tab tablet       traZODone (DESYREL) 50 MG tablet Take 50 mg by mouth nightly.      fluconazole (DIFLUCAN) 150 MG Tab States takes as needed      fluticasone (FLONASE) 50 mcg/actuation nasal spray 1 spray (50 mcg total) by Each Nare route 2 (two) times daily as needed for Rhinitis. (Patient not taking: Reported on 2/28/2022) 15 g 0    FLUZONE HIGHDOSE QUAD 20-21  mcg/0.7 mL Syrg PHARMACIST ADMINISTERED IMMUNIZATION ADMINISTERED AT TIME OF DISPENSING      glimepiride (AMARYL) 2 MG tablet Take 1 tablet (2 mg total) by mouth before breakfast. for 7 days 7 tablet 0    ibuprofen (ADVIL,MOTRIN) 800 MG tablet       oxyCODONE-acetaminophen (PERCOCET) 5-325 mg per tablet Take 1 tablet by mouth every 6 (six) hours as needed for Pain. (Patient not taking: Reported on 2/28/2022) 20 tablet 0    traMADol (ULTRAM) 50 mg tablet Take 1 tablet (50 mg total) by mouth every 6 (six) hours as needed for Pain. (Patient not taking:  Reported on 2/28/2022) 12 tablet 0     No facility-administered encounter medications on file as of 2/28/2022.         PHYSICAL EXAMINATION:    The patient generally appears in good health, is appropriately interactive, and is in no apparent distress.    Skin: No lesions.    Mental: Cooperative with normal affect.    Neuro: Grossly intact.    HEENT: Normal. No evidence of lymphadenopathy.    Chest:  normal inspiratory effort.    Abdomen:  Soft, non-tender. No masses or organomegaly. Bladder is not palpable. No evidence of flank discomfort. No evidence of inguinal hernia.    Extremities: No clubbing, cyanosis, or edema    Normal external female genitalia  Urethral meatus is normal  Urethra and bladder are nontender to bimanual exam  Well supported anteriorly and posteriorly.  No sutures present  Uterus and cervix are surgically absent.   No adnexal masses    LABS:    Lab Results   Component Value Date    BUN 13 01/12/2022    CREATININE 0.7 01/12/2022     UA 1.020, pH 5, + leuk, 100 glucose, otherwise, negative      IMPRESSION:    Encounter Diagnoses   Name Primary?    Post-operative state Yes       PLAN:    1.  Lifted limitations.  However, advised to start back slowly  2.  Discussed being sexually active (may be better to be on top to better control the thrusting and discussed how to relax.)  3.  Follow up in 3 months.

## 2022-11-03 ENCOUNTER — PATIENT MESSAGE (OUTPATIENT)
Dept: UROLOGY | Facility: CLINIC | Age: 67
End: 2022-11-03
Payer: MEDICARE

## 2022-11-30 ENCOUNTER — OFFICE VISIT (OUTPATIENT)
Dept: UROLOGY | Facility: CLINIC | Age: 67
End: 2022-11-30
Payer: MEDICARE

## 2022-11-30 VITALS
WEIGHT: 149.94 LBS | BODY MASS INDEX: 25.6 KG/M2 | SYSTOLIC BLOOD PRESSURE: 122 MMHG | HEART RATE: 96 BPM | HEIGHT: 64 IN | DIASTOLIC BLOOD PRESSURE: 68 MMHG

## 2022-11-30 DIAGNOSIS — N81.6 RECTOCELE: Primary | ICD-10-CM

## 2022-11-30 DIAGNOSIS — Z09 S/P UROLOGICAL SURGERY, FOLLOW-UP EXAM: ICD-10-CM

## 2022-11-30 PROCEDURE — 99214 OFFICE O/P EST MOD 30 MIN: CPT | Mod: S$GLB,,, | Performed by: UROLOGY

## 2022-11-30 PROCEDURE — 3008F PR BODY MASS INDEX (BMI) DOCUMENTED: ICD-10-PCS | Mod: CPTII,S$GLB,, | Performed by: UROLOGY

## 2022-11-30 PROCEDURE — 3078F DIAST BP <80 MM HG: CPT | Mod: CPTII,S$GLB,, | Performed by: UROLOGY

## 2022-11-30 PROCEDURE — 99214 PR OFFICE/OUTPT VISIT, EST, LEVL IV, 30-39 MIN: ICD-10-PCS | Mod: S$GLB,,, | Performed by: UROLOGY

## 2022-11-30 PROCEDURE — 3078F PR MOST RECENT DIASTOLIC BLOOD PRESSURE < 80 MM HG: ICD-10-PCS | Mod: CPTII,S$GLB,, | Performed by: UROLOGY

## 2022-11-30 PROCEDURE — 3051F HG A1C>EQUAL 7.0%<8.0%: CPT | Mod: CPTII,S$GLB,, | Performed by: UROLOGY

## 2022-11-30 PROCEDURE — 99213 OFFICE O/P EST LOW 20 MIN: CPT | Mod: PBBFAC | Performed by: UROLOGY

## 2022-11-30 PROCEDURE — 4010F PR ACE/ARB THEARPY RXD/TAKEN: ICD-10-PCS | Mod: CPTII,S$GLB,, | Performed by: UROLOGY

## 2022-11-30 PROCEDURE — 3074F PR MOST RECENT SYSTOLIC BLOOD PRESSURE < 130 MM HG: ICD-10-PCS | Mod: CPTII,S$GLB,, | Performed by: UROLOGY

## 2022-11-30 PROCEDURE — 1159F MED LIST DOCD IN RCRD: CPT | Mod: CPTII,S$GLB,, | Performed by: UROLOGY

## 2022-11-30 PROCEDURE — 3074F SYST BP LT 130 MM HG: CPT | Mod: CPTII,S$GLB,, | Performed by: UROLOGY

## 2022-11-30 PROCEDURE — 1159F PR MEDICATION LIST DOCUMENTED IN MEDICAL RECORD: ICD-10-PCS | Mod: CPTII,S$GLB,, | Performed by: UROLOGY

## 2022-11-30 PROCEDURE — 81002 URINALYSIS NONAUTO W/O SCOPE: CPT | Mod: PBBFAC | Performed by: UROLOGY

## 2022-11-30 PROCEDURE — 1126F AMNT PAIN NOTED NONE PRSNT: CPT | Mod: CPTII,S$GLB,, | Performed by: UROLOGY

## 2022-11-30 PROCEDURE — 1126F PR PAIN SEVERITY QUANTIFIED, NO PAIN PRESENT: ICD-10-PCS | Mod: CPTII,S$GLB,, | Performed by: UROLOGY

## 2022-11-30 PROCEDURE — 99999 PR PBB SHADOW E&M-EST. PATIENT-LVL III: CPT | Mod: PBBFAC,,, | Performed by: UROLOGY

## 2022-11-30 PROCEDURE — 3008F BODY MASS INDEX DOCD: CPT | Mod: CPTII,S$GLB,, | Performed by: UROLOGY

## 2022-11-30 PROCEDURE — 99999 PR PBB SHADOW E&M-EST. PATIENT-LVL III: ICD-10-PCS | Mod: PBBFAC,,, | Performed by: UROLOGY

## 2022-11-30 PROCEDURE — 4010F ACE/ARB THERAPY RXD/TAKEN: CPT | Mod: CPTII,S$GLB,, | Performed by: UROLOGY

## 2022-11-30 PROCEDURE — 3051F PR MOST RECENT HEMOGLOBIN A1C LEVEL 7.0 - < 8.0%: ICD-10-PCS | Mod: CPTII,S$GLB,, | Performed by: UROLOGY

## 2022-11-30 NOTE — PROGRESS NOTES
CHIEF COMPLAINT:    Mrs. Garcia is a 67 y.o. female presenting for a follow up after prolapse repair    PRESENTING ILLNESS:    Cassandra Garcia is a 67 y.o. female who is presents with a history of SSL fixation and anterior colporrhaphy on 1/11/2022.  She states she is doing very well. No symptoms of prolapse.  No stress incontinence though she wears a panty liner and changes it 4-5 times a day.  However, she has seepage from the rectum.  Sometimes she feels like she has to wipe and wipe to get clean. She denies urge incontinence or stress incontinence.      REVIEW OF SYSTEMS:    Review of Systems   Constitutional: Negative.    HENT: Negative.     Eyes: Negative.    Respiratory: Negative.     Cardiovascular: Negative.    Gastrointestinal: Negative.    Genitourinary: Negative.    Musculoskeletal: Negative.    Skin: Negative.    Neurological: Negative.    Endo/Heme/Allergies:         Diabetic   Psychiatric/Behavioral: Negative.       PATIENT HISTORY:    Past Medical History:   Diagnosis Date    Anticoagulant long-term use     Diabetes mellitus     type II dm    Hypertension        Past Surgical History:   Procedure Laterality Date    ANTERIOR VAGINAL REPAIR N/A 01/11/2022    Procedure: COLPORRHAPHY, ANTERIOR;  Surgeon: Simi Mckeon MD;  Location: [address];  Service: Urology;  Laterality: N/A;    CYSTOSCOPY N/A 01/11/2022    Procedure: CYSTOSCOPY;  Surgeon: Simi Mckeon MD;  Location: [address];  Service: Urology;  Laterality: N/A;    HERNIA REPAIR      HYSTERECTOMY  2011    Complete    OOPHORECTOMY Bilateral 2011    SACROSPINOUS LIGAMENT FIXATION N/A 01/11/2022    Procedure: FIXATION, LIGAMENT, SACROSPINOUS;  Surgeon: Simi Mckeon MD;  Location: [address];  Service: Urology;  Laterality: N/A;    TONSILLECTOMY      VENTRAL HERNIA REPAIR  09/10/2017       No family history on file.    Social History     Socioeconomic History    Marital status:    Tobacco Use    Smoking status: Never     Smokeless tobacco: Never   Substance and Sexual Activity    Alcohol use: No    Drug use: Never    Sexual activity: Never     Partners: Male       Allergies:  Patient has no known allergies.    Medications:  Outpatient Encounter Medications as of 11/30/2022   Medication Sig Dispense Refill    atorvastatin (LIPITOR) 10 MG tablet Take 10 mg by mouth once daily.      dextroamphetamine-amphetamine (ADDERALL XR) 30 MG 24 hr capsule Take 30 mg by mouth every morning.      fluconazole (DIFLUCAN) 150 MG Tab States takes as needed      fluticasone (FLONASE) 50 mcg/actuation nasal spray 1 spray (50 mcg total) by Each Nare route 2 (two) times daily as needed for Rhinitis. 15 g 0    FLUZONE HIGHDOSE QUAD 20-21  mcg/0.7 mL Syrg PHARMACIST ADMINISTERED IMMUNIZATION ADMINISTERED AT TIME OF DISPENSING      ibuprofen (ADVIL,MOTRIN) 800 MG tablet       lisinopril (PRINIVIL,ZESTRIL) 2.5 MG tablet Take 1 tablet by mouth once daily.      metFORMIN (GLUCOPHAGE) 500 MG tablet Take 1 tablet (500 mg total) by mouth 2 (two) times daily with meals. 60 tablet 0    ondansetron (ZOFRAN) 4 MG tablet       TRADJENTA 5 mg Tab tablet       traMADol (ULTRAM) 50 mg tablet Take 1 tablet (50 mg total) by mouth every 6 (six) hours as needed for Pain. 12 tablet 0    traZODone (DESYREL) 50 MG tablet Take 50 mg by mouth nightly.      glimepiride (AMARYL) 2 MG tablet Take 1 tablet (2 mg total) by mouth before breakfast. for 7 days 7 tablet 0    oxyCODONE-acetaminophen (PERCOCET) 5-325 mg per tablet Take 1 tablet by mouth every 6 (six) hours as needed for Pain. (Patient not taking: Reported on 11/30/2022) 20 tablet 0     No facility-administered encounter medications on file as of 11/30/2022.         PHYSICAL EXAMINATION:    The patient generally appears in good health, is appropriately interactive, and is in no apparent distress.    Skin: No lesions.    Mental: Cooperative with normal affect.    Neuro: Grossly intact.    HEENT: Normal. No evidence of  lymphadenopathy.    Chest:  normal inspiratory effort.    Abdomen: Soft, non-tender. No masses or organomegaly. Bladder is not palpable. No evidence of flank discomfort. No evidence of inguinal hernia.    Extremities: No clubbing, cyanosis, or edema    Normal external female genitalia  Urethral meatus is normal  Urethra and bladder are nontender to bimanual exam  Well supported anteriorly and   posteriorly there is a low lying rectocele, best appreciated on rectal exam.   Uterus and cervix are normal  No adnexal masses    LABS:    Lab Results   Component Value Date    BUN 13 01/12/2022    CREATININE 0.7 01/12/2022       UA 1.015, pH 5, 250 glucose, otherwise, negative.     IMPRESSION:    Rectocele  Status post urologic surgery, follow up exam    PLAN:    1.  Discussed rectocele and the [facility] handout was provided  2.  Follow up in 6 months.  Sooner if she would like to have the rectocele addressed.    I spent 30 minutes with the patient of which more than half was spent in direct consultation with the patient in regards to our treatment and plan.

## 2023-02-20 ENCOUNTER — TELEPHONE (OUTPATIENT)
Dept: UROLOGY | Facility: CLINIC | Age: 68
End: 2023-02-20
Payer: MEDICARE

## 2023-02-20 NOTE — TELEPHONE ENCOUNTER
----- Message from Rylee Moreno RN sent at 2/15/2023  2:59 PM CST -----    ----- Message -----  From: Katy Barrett  Sent: 2/15/2023   1:17 PM CST  To: Mike [facility] Staff    Cassandra Garcia calling regarding Appointment Access  wanting to schedule a surgery for prolapse, says doctor is aware of her condition, call back [phone]

## 2023-03-06 ENCOUNTER — OFFICE VISIT (OUTPATIENT)
Dept: UROLOGY | Facility: CLINIC | Age: 68
End: 2023-03-06
Payer: MEDICARE

## 2023-03-06 VITALS
DIASTOLIC BLOOD PRESSURE: 82 MMHG | SYSTOLIC BLOOD PRESSURE: 164 MMHG | BODY MASS INDEX: 26.34 KG/M2 | HEIGHT: 64 IN | HEART RATE: 85 BPM | WEIGHT: 154.31 LBS

## 2023-03-06 DIAGNOSIS — N81.6 RECTOCELE: ICD-10-CM

## 2023-03-06 DIAGNOSIS — N95.2 VAGINAL ATROPHY: Primary | ICD-10-CM

## 2023-03-06 DIAGNOSIS — Z09 S/P UROLOGICAL SURGERY, FOLLOW-UP EXAM: ICD-10-CM

## 2023-03-06 PROCEDURE — 1126F AMNT PAIN NOTED NONE PRSNT: CPT | Mod: CPTII,S$GLB,, | Performed by: UROLOGY

## 2023-03-06 PROCEDURE — 1101F PT FALLS ASSESS-DOCD LE1/YR: CPT | Mod: CPTII,S$GLB,, | Performed by: UROLOGY

## 2023-03-06 PROCEDURE — 3288F PR FALLS RISK ASSESSMENT DOCUMENTED: ICD-10-PCS | Mod: CPTII,S$GLB,, | Performed by: UROLOGY

## 2023-03-06 PROCEDURE — 4010F ACE/ARB THERAPY RXD/TAKEN: CPT | Mod: CPTII,S$GLB,, | Performed by: UROLOGY

## 2023-03-06 PROCEDURE — 99215 PR OFFICE/OUTPT VISIT, EST, LEVL V, 40-54 MIN: ICD-10-PCS | Mod: S$GLB,,, | Performed by: UROLOGY

## 2023-03-06 PROCEDURE — 1159F MED LIST DOCD IN RCRD: CPT | Mod: CPTII,S$GLB,, | Performed by: UROLOGY

## 2023-03-06 PROCEDURE — 99215 OFFICE O/P EST HI 40 MIN: CPT | Mod: S$GLB,,, | Performed by: UROLOGY

## 2023-03-06 PROCEDURE — 1101F PR PT FALLS ASSESS DOC 0-1 FALLS W/OUT INJ PAST YR: ICD-10-PCS | Mod: CPTII,S$GLB,, | Performed by: UROLOGY

## 2023-03-06 PROCEDURE — 1159F PR MEDICATION LIST DOCUMENTED IN MEDICAL RECORD: ICD-10-PCS | Mod: CPTII,S$GLB,, | Performed by: UROLOGY

## 2023-03-06 PROCEDURE — 3077F SYST BP >= 140 MM HG: CPT | Mod: CPTII,S$GLB,, | Performed by: UROLOGY

## 2023-03-06 PROCEDURE — 3079F PR MOST RECENT DIASTOLIC BLOOD PRESSURE 80-89 MM HG: ICD-10-PCS | Mod: CPTII,S$GLB,, | Performed by: UROLOGY

## 2023-03-06 PROCEDURE — 3079F DIAST BP 80-89 MM HG: CPT | Mod: CPTII,S$GLB,, | Performed by: UROLOGY

## 2023-03-06 PROCEDURE — 4010F PR ACE/ARB THEARPY RXD/TAKEN: ICD-10-PCS | Mod: CPTII,S$GLB,, | Performed by: UROLOGY

## 2023-03-06 PROCEDURE — 99999 PR PBB SHADOW E&M-EST. PATIENT-LVL III: ICD-10-PCS | Mod: PBBFAC,,, | Performed by: UROLOGY

## 2023-03-06 PROCEDURE — 3077F PR MOST RECENT SYSTOLIC BLOOD PRESSURE >= 140 MM HG: ICD-10-PCS | Mod: CPTII,S$GLB,, | Performed by: UROLOGY

## 2023-03-06 PROCEDURE — 3008F PR BODY MASS INDEX (BMI) DOCUMENTED: ICD-10-PCS | Mod: CPTII,S$GLB,, | Performed by: UROLOGY

## 2023-03-06 PROCEDURE — 1126F PR PAIN SEVERITY QUANTIFIED, NO PAIN PRESENT: ICD-10-PCS | Mod: CPTII,S$GLB,, | Performed by: UROLOGY

## 2023-03-06 PROCEDURE — 3008F BODY MASS INDEX DOCD: CPT | Mod: CPTII,S$GLB,, | Performed by: UROLOGY

## 2023-03-06 PROCEDURE — 3288F FALL RISK ASSESSMENT DOCD: CPT | Mod: CPTII,S$GLB,, | Performed by: UROLOGY

## 2023-03-06 PROCEDURE — 99999 PR PBB SHADOW E&M-EST. PATIENT-LVL III: CPT | Mod: PBBFAC,,, | Performed by: UROLOGY

## 2023-03-06 RX ORDER — ESTRADIOL 0.1 MG/G
1 CREAM VAGINAL
Qty: 45 G | Refills: 3 | Status: SHIPPED | OUTPATIENT
Start: 2023-03-06

## 2023-03-06 NOTE — PROGRESS NOTES
CHIEF COMPLAINT:    Mrs. Garcia is a 67 y.o. female presenting for a follow up after anterior colporrhaphy, SSL fixation with a recurrence in the posterior compartment.    PRESENTING ILLNESS:    Cassandra Garcia is a 67 y.o. female who is presents with a history of anterior colporrhaphy and SSL fixation on 1/11/2022. When she presented for follow up in November 2022, she stated that she had seepage from the rectum and could not get clean.  She states now that she is not having that issue.  Her bowels are controlled with diet and added a fiber supplement and she states she is doing well.  She does have a vaginal discharge, that causes her to have to change her panty liner 3 times a day.  She denies feeling stress or urge incontinence.  No symptoms of a bladder infection.  She states that she does not always have a discharge.     She is under a fair amount of stress as her daughter is dealing with drug addiction (heroin and methamphetamine), and lives at the patient's house.      REVIEW OF SYSTEMS:    Review of Systems   Constitutional: Negative.    HENT: Negative.     Eyes: Negative.    Respiratory: Negative.     Cardiovascular: Negative.    Gastrointestinal: Negative.    Genitourinary: Negative.         Vaginal discharge   Musculoskeletal: Negative.    Skin: Negative.    Neurological: Negative.    Endo/Heme/Allergies:         Diabetic   Psychiatric/Behavioral: Negative.       PATIENT HISTORY:    Past Medical History:   Diagnosis Date    Anticoagulant long-term use     Diabetes mellitus     type II dm    Hypertension        Past Surgical History:   Procedure Laterality Date    ANTERIOR VAGINAL REPAIR N/A 01/11/2022    Procedure: COLPORRHAPHY, ANTERIOR;  Surgeon: Simi Mckeon MD;  Location: [facility] OR [address] [place] [address];  Service: Urology;  Laterality: N/A;    CYSTOSCOPY N/A 01/11/2022    Procedure: CYSTOSCOPY;  Surgeon: Simi Mckeon MD;  Location: [facility] OR [address] [place] [address];  Service: Urology;  Laterality: N/A;    HERNIA REPAIR       HYSTERECTOMY  2011    Complete    OOPHORECTOMY Bilateral 2011    SACROSPINOUS LIGAMENT FIXATION N/A 01/11/2022    Procedure: FIXATION, LIGAMENT, SACROSPINOUS;  Surgeon: Simi Mckeon MD;  Location: [facility] OR [address] [place] [address];  Service: Urology;  Laterality: N/A;    TONSILLECTOMY      VENTRAL HERNIA REPAIR  09/10/2017       History reviewed. No pertinent family history.    Social History     Socioeconomic History    Marital status:    Tobacco Use    Smoking status: Never    Smokeless tobacco: Never   Substance and Sexual Activity    Alcohol use: No    Drug use: Never    Sexual activity: Never     Partners: Male       Allergies:  Patient has no known allergies.    Medications:  Outpatient Encounter Medications as of 3/6/2023   Medication Sig Dispense Refill    atorvastatin (LIPITOR) 10 MG tablet Take 10 mg by mouth once daily.      dextroamphetamine-amphetamine (ADDERALL XR) 30 MG 24 hr capsule Take 30 mg by mouth every morning.      fluconazole (DIFLUCAN) 150 MG Tab States takes as needed      fluticasone (FLONASE) 50 mcg/actuation nasal spray 1 spray (50 mcg total) by Each Nare route 2 (two) times daily as needed for Rhinitis. 15 g 0    FLUZONE HIGHDOSE QUAD 20-21  mcg/0.7 mL Syrg PHARMACIST ADMINISTERED IMMUNIZATION ADMINISTERED AT TIME OF DISPENSING      glimepiride (AMARYL) 2 MG tablet Take 1 tablet (2 mg total) by mouth before breakfast. for 7 days 7 tablet 0    ibuprofen (ADVIL,MOTRIN) 800 MG tablet       lisinopril (PRINIVIL,ZESTRIL) 2.5 MG tablet Take 1 tablet by mouth once daily.      metFORMIN (GLUCOPHAGE) 500 MG tablet Take 1 tablet (500 mg total) by mouth 2 (two) times daily with meals. 60 tablet 0    ondansetron (ZOFRAN) 4 MG tablet       oxyCODONE-acetaminophen (PERCOCET) 5-325 mg per tablet Take 1 tablet by mouth every 6 (six) hours as needed for Pain. 20 tablet 0    TRADJENTA 5 mg Tab tablet       traMADol (ULTRAM) 50 mg tablet Take 1 tablet (50 mg total) by mouth every 6 (six) hours as needed  for Pain. 12 tablet 0    traZODone (DESYREL) 50 MG tablet Take 50 mg by mouth nightly.       No facility-administered encounter medications on file as of 3/6/2023.         PHYSICAL EXAMINATION:    The patient generally appears in good health, is appropriately interactive, and is in no apparent distress.    Skin: No lesions.    Mental: Cooperative with normal affect.    Neuro: Grossly intact.    HEENT: Normal. No evidence of lymphadenopathy.    Chest:  normal inspiratory effort.    Abdomen: Soft, non-tender. No masses or organomegaly. Bladder is not palpable. No evidence of flank discomfort. No evidence of inguinal hernia.    Extremities: No clubbing, cyanosis, or edema    Normal external female genitalia  Grade II urogenital atrophy  Urethral meatus is normal  Urethra and bladder are nontender to bimanual exam  Well supported anteriorly and posteriorly   Uterus and cervix are surgically absent  No adnexal masses    Aa -0.5, Ba -0.5, C -5.5  gH 5, pB 2, TVL  7  Ap 0, Bp 0, D n/a      LABS:    Lab Results   Component Value Date    BUN 13 01/12/2022    CREATININE 0.7 01/12/2022       UA 1.020, pH 5, otherwise, negative.     IMPRESSION:    Rectocele  Status post urological surgery  Vaginal atrophy    PLAN:    1.  Estrace cream prescribed for the vaginal atrophy.  Information sheet provided from [facility] website.  2.  She denies symptoms of stool trapping or needing to splint to have a bm.  Will continue to observe  3.  Follow up in 6 months.    I spent 40 minutes with the patient of which more than half was spent in direct consultation with the patient in regards to our treatment and plan.

## 2023-07-19 ENCOUNTER — PATIENT OUTREACH (OUTPATIENT)
Dept: ADMINISTRATIVE | Facility: HOSPITAL | Age: 68
End: 2023-07-19
Payer: MEDICAID

## 2023-07-19 NOTE — PROGRESS NOTES
Population Health Chart Review & Patient Outreach Details:     Reason for Outreach Encounter:     []  Non-Compliant Report   [x]  Payor Report ([employer], PHN, BCBS, MSSP, MCIP, UHC, etc.)   []  Pre-Visit Chart Review     Updates Requested / Reviewed:     []  Care Everywhere    []     []  External Sources ([facility], [facility], DIS, etc.)   []  Care Team Updated    Patient Outreach Method:    []  Telephone Outreach Completed   [] Successful   [] Left Voicemail   [] Unable to Contact (wrong number, no voicemail)  []  [facility] Portal Outreach Sent  [x]  Letter Outreach Mailed  []  Fax Sent for External Records  []  External Records Upload    Health Maintenance Topics Addressed and Outreach Outcomes / Actions Taken:        []      Breast Cancer Screening []  Mammo Scheduled      []  External Records Requested     []  Added Reminder to Complete to Upcoming Primary Care Appt Notes     []  Patient Declined     []  Patient Will Call Back to Schedule     []  Patient Will Schedule with External Provider / Order Routed if Applicable             []       Cervical Cancer Screening []  Pap Scheduled      []  External Records Requested     []  Added Reminder to Complete to Upcoming Primary Care Appt Notes     []  Patient Declined     []  Patient Will Call Back to Schedule     []  Patient Will Schedule with External Provider               []          Colorectal Cancer Screening []  Colonoscopy Case Request or Referral Placed     []  External Records Requested     []  Added Reminder to Complete to Upcoming Primary Care Appt Notes     []  Patient Declined     []  Patient Will Call Back to Schedule     []  Patient Will Schedule with External Provider     []  Fit Kit Mailed (add the SmartPhrase under additional notes)     []  Reminded Patient to Complete Home Test             []      Diabetic Eye Exam []  Eye Camera Scheduled or Optometry Referral Placed     []  External Records Requested     []  Added Reminder to Complete to  Upcoming Primary Care Appt Notes     []  Patient Declined     []  Patient Will Call Back to Schedule     []  Patient Will Schedule with External Provider             []      Blood Pressure Control []  Primary Care Follow Up Visit Scheduled     []  Remote Blood Pressure Reading Captured     []  Added Reminder to Complete to Upcoming Primary Care Appt Notes     []  Patient Declined     []  Patient Will Call Back / Patient Will Send Portal Message with Reading     []  Patient Will Call Back to Schedule Provider Visit             []       HbA1c & Other Labs []  Lab Appt Scheduled for Due Labs     []  Primary Care Follow Up Visit Scheduled      []  Reminded Patient to Complete Home Test     []  Added Reminder to Complete to Upcoming Primary Care Appt Notes     []  Patient Declined     []  Patient Will Call Back to Schedule     []  Patient Will Schedule with External Provider / Order Routed if Applicable           []    Schedule Primary Care Appt []  Primary Care Appt Scheduled     []  Patient Declined     []  Patient Will Call Back to Schedule     [x]  Pt Established with External Provider & Updated Care Team             []      Medication Adherence []  Primary Care Appointment Scheduled     []  Added Reminder to Upcoming Primary Care Appt Notes     []  Patient Reminded to  Prescription     []  Patient Declined, Provider Notified if Needed     []  Sent Provider Message to Review and/or Add Exclusion to Problem List             []      Osteoporosis Screening []  DXA Appointment Scheduled     []  External Records Requested     []  Added Reminder to Complete to Upcoming Primary Care Appt Notes     []  Patient Declined     []  Patient Will Call Back to Schedule     []  Patient Will Schedule with External Provider / Order Routed if Applicable     Additional Care Coordinator Notes:     Letter out to change PCP with insurance    Further Action Needed If Patient Returns Outreach:

## 2023-07-19 NOTE — LETTER
July 19, 2023    Cassandra Garcia  [address]  [place] [address]             [facility]  [address]  [place] [address]  Phone: [phone] Dear Ms Garcia,      We have received notification from your insurance company, [employer],  as having [place] Salcido MD as your primary care provider.   If you already have another primary care provider, please let your insurance company know, so that they may update your PCP on your policy and ID card.    Sincerely,        [facility]

## 2023-12-14 ENCOUNTER — PATIENT OUTREACH (OUTPATIENT)
Dept: ADMINISTRATIVE | Facility: HOSPITAL | Age: 68
End: 2023-12-14
Payer: MEDICAID

## 2023-12-14 NOTE — LETTER
December 14, 2023    Cassandra Garcia  [address]  [facility] [address]             [facility]  [address]  [facility] [address]  Phone: [phone] Dear Ms. Garcia    Your insurance company has informed us that you have Felicia Santos MD   listed as your primary care provider.      Our records indicate that you are not being seen by Felicia Santos MD.    If this provider information is incorrect, please contact your insurance company and have them update their records with the correct provider.     If you would like to establish or reestablish care, please call us at [phone], and we will be happy to get you scheduled with one of our Primary Care Providers.      Sincerely,        [facility]

## 2023-12-14 NOTE — PROGRESS NOTES
Population Health Chart Review & Patient Outreach Details    Outreach Performed: YES Letter    Additional Pop Health Notes:           Updates Requested / Reviewed:               Health Maintenance Topics Overdue:    Health Maintenance Due   Topic Date Due    TETANUS VACCINE  Never done    RSV Vaccine (Age 60+ and Pregnant patients) (1 - 1-dose 60+ series) Never done    Shingles Vaccine (2 of 3) 03/09/2018         Health Maintenance Topic(s) Outreach Outcomes & Actions Taken:    Primary Care Appt - Outreach Outcomes & Actions Taken  : Pt Established with External Provider, Updated Care Team, & Informed Pt to Notify Payor if Applicable

## 2024-01-11 ENCOUNTER — TELEPHONE (OUTPATIENT)
Dept: PODIATRY | Facility: CLINIC | Age: 69
End: 2024-01-11
Payer: MEDICARE

## 2024-01-30 ENCOUNTER — OFFICE VISIT (OUTPATIENT)
Dept: PODIATRY | Facility: CLINIC | Age: 69
End: 2024-01-30
Payer: MEDICARE

## 2024-01-30 VITALS — BODY MASS INDEX: 24.92 KG/M2 | HEIGHT: 64 IN | WEIGHT: 146 LBS

## 2024-01-30 DIAGNOSIS — M20.11 VALGUS DEFORMITY OF BOTH GREAT TOES: ICD-10-CM

## 2024-01-30 DIAGNOSIS — L03.032 PARONYCHIA OF GREAT TOE OF LEFT FOOT: ICD-10-CM

## 2024-01-30 DIAGNOSIS — L84 CORN OR CALLUS: ICD-10-CM

## 2024-01-30 DIAGNOSIS — M20.12 VALGUS DEFORMITY OF BOTH GREAT TOES: ICD-10-CM

## 2024-01-30 DIAGNOSIS — E11.49 TYPE II DIABETES MELLITUS WITH NEUROLOGICAL MANIFESTATIONS: Primary | ICD-10-CM

## 2024-01-30 PROCEDURE — 99204 OFFICE O/P NEW MOD 45 MIN: CPT | Mod: S$GLB,,, | Performed by: PODIATRIST

## 2024-01-30 PROCEDURE — 99999 PR PBB SHADOW E&M-EST. PATIENT-LVL III: CPT | Mod: PBBFAC,,, | Performed by: PODIATRIST

## 2024-01-30 RX ORDER — HUMAN INSULIN 100 [IU]/ML
INJECTION, SUSPENSION SUBCUTANEOUS
COMMUNITY

## 2024-01-30 RX ORDER — COVID-19 VACCINE, MRNA 0.04 MG/.418ML
INJECTION, SUSPENSION INTRAMUSCULAR
COMMUNITY
Start: 2023-10-23

## 2024-01-30 RX ORDER — PNEUMOCOCCAL 20-VALENT CONJUGATE VACCINE 2.2; 2.2; 2.2; 2.2; 2.2; 2.2; 2.2; 2.2; 2.2; 2.2; 2.2; 2.2; 2.2; 2.2; 2.2; 2.2; 4.4; 2.2; 2.2; 2.2 UG/.5ML; UG/.5ML; UG/.5ML; UG/.5ML; UG/.5ML; UG/.5ML; UG/.5ML; UG/.5ML; UG/.5ML; UG/.5ML; UG/.5ML; UG/.5ML; UG/.5ML; UG/.5ML; UG/.5ML; UG/.5ML; UG/.5ML; UG/.5ML; UG/.5ML; UG/.5ML
INJECTION, SUSPENSION INTRAMUSCULAR
COMMUNITY
Start: 2023-10-23

## 2024-01-30 RX ORDER — AMOXICILLIN AND CLAVULANATE POTASSIUM 875; 125 MG/1; MG/1
1 TABLET, FILM COATED ORAL 2 TIMES DAILY
Qty: 14 TABLET | Refills: 0 | Status: SHIPPED | OUTPATIENT
Start: 2024-01-30 | End: 2024-02-06

## 2024-01-30 RX ORDER — METHOCARBAMOL 500 MG/1
TABLET, FILM COATED ORAL
COMMUNITY

## 2024-01-30 NOTE — PROGRESS NOTES
Subjective:      Patient ID: Cassandra Garcia is a 68 y.o. female.    Chief Complaint: Recurrent Skin Infections    Cassandra is a 68 y.o. female who presents to the clinic upon referral from Dr. Huber  for evaluation and treatment of diabetic feet. Cassandra has a past medical history of Anticoagulant long-term use, Diabetes mellitus, and Hypertension. Patient relates no major problem with feet. Only complaints today consist of painful calluses and pain and redness around the base of the left hallux nail. This has been going on for a couple weeks and she relates it keeps her from going walking and exercising    PCP: Kajal Castle MD        Current shoe gear: Casual shoes    Hemoglobin A1C   Date Value Ref Range Status   01/06/2022 7.8 (H) 4.0 - 5.6 % Final     Comment:     ADA Screening Guidelines:  5.7-6.4%  Consistent with prediabetes  >or=6.5%  Consistent with diabetes    High levels of fetal hemoglobin interfere with the HbA1C  assay. Heterozygous hemoglobin variants (HbS, HgC, etc)do  not significantly interfere with this assay.   However, presence of multiple variants may affect accuracy.           Review of Systems   Constitutional: Negative for chills and fever.   Cardiovascular:  Negative for claudication and leg swelling.   Respiratory:  Negative for shortness of breath.    Skin:  Negative for itching, nail changes and rash.   Musculoskeletal:  Negative for muscle cramps, muscle weakness and myalgias.   Gastrointestinal:  Negative for nausea and vomiting.   Neurological:  Positive for paresthesias. Negative for focal weakness, loss of balance and numbness.           Objective:      Physical Exam  Constitutional:       General: She is not in acute distress.     Appearance: She is well-developed. She is not diaphoretic.   Cardiovascular:      Pulses:           Dorsalis pedis pulses are 2+ on the right side and 2+ on the left side.        Posterior tibial pulses are 2+ on the right side and 2+ on the left side.       Comments: < 3 sec capillary refill time to toes 1-5 bilateral. Toes and feet are warm to touch proximally with normal distal cooling b/l. There is some hair growth on the feet and toes b/l. There is no edema b/l. No spider veins or varicosities present b/l.     Musculoskeletal:      Comments: Equinus noted b/l ankles with < 10 deg DF noted. MMT 5/5 in DF/PF/Inv/Ev resistance with no reproduction of pain in any direction. Passive range of motion of ankle and pedal joints is painless b/l.    Medial 1st MTPJ exostosis bilateral. Lateral deviation of hallux, non trackbound. No pain w/ ROM to 1st or 2nd MTPJs. No First ray hypermobility or sub second MT head callus. There is left foot second toe MTPJ subluxation with the toe drifting dorsal and medial and overlapping and sitting on the base of the hallux nail laterally.      Skin:     General: Skin is warm and dry.      Coloration: Skin is not pale.      Findings: No abrasion, bruising, burn, ecchymosis, erythema, laceration, lesion, petechiae or rash.      Nails: There is no clubbing.      Comments: Skin temperature, texture and turgor within normal limits.    Hyperkeratotic lesions to plantar medial right first met head and left hallux    Left hallux nail there is erythema and edema to the skin at the base of the hallux, no active drainage noted   Neurological:      Mental Status: She is alert and oriented to person, place, and time.      Sensory: No sensory deficit.      Motor: No tremor, atrophy or abnormal muscle tone.      Comments: Negative tinel sign bilateral. Diminished vibratory sensations bilateral   Psychiatric:         Behavior: Behavior normal.               Assessment:       Encounter Diagnoses   Name Primary?    Type II diabetes mellitus with neurological manifestations Yes    Corn or callus     Valgus deformity of both great toes     Paronychia of great toe of left foot          Plan:       Cassandra was seen today for recurrent skin  infections.    Diagnoses and all orders for this visit:    Type II diabetes mellitus with neurological manifestations    Corn or callus    Valgus deformity of both great toes    Paronychia of great toe of left foot    Other orders  -     amoxicillin-clavulanate 875-125mg (AUGMENTIN) 875-125 mg per tablet; Take 1 tablet by mouth 2 (two) times daily. for 7 days      I counseled the patient on her conditions, their implications and medical management.    Shoe inspection. Diabetic Foot Education. Patient reminded of the importance of good nutrition and blood sugar control to help prevent podiatric complications of diabetes. Patient instructed on proper foot hygeine. We discussed wearing proper shoe gear, daily foot inspections, never walking without protective shoe gear, never putting sharp instruments to feet    -Recommend toe spacers to keep the 1-2 toes separate on the left foot    -Start on Augmentin to treat the paronychia if the redness does not resolve may need to remove the nail    -Use ebanel cream on the calluses and offload with diabetic shoes and inserts, the prescription and a list of places was dispensed    Return in 3 weeks follow up sooner KAI Ramírez DPM

## 2024-02-20 ENCOUNTER — OFFICE VISIT (OUTPATIENT)
Dept: PODIATRY | Facility: CLINIC | Age: 69
End: 2024-02-20
Payer: MEDICARE

## 2024-02-20 VITALS — BODY MASS INDEX: 24.92 KG/M2 | HEIGHT: 64 IN | WEIGHT: 145.94 LBS

## 2024-02-20 DIAGNOSIS — E11.49 TYPE II DIABETES MELLITUS WITH NEUROLOGICAL MANIFESTATIONS: Primary | ICD-10-CM

## 2024-02-20 DIAGNOSIS — M20.11 VALGUS DEFORMITY OF BOTH GREAT TOES: ICD-10-CM

## 2024-02-20 DIAGNOSIS — L84 CORN OR CALLUS: ICD-10-CM

## 2024-02-20 DIAGNOSIS — M20.12 VALGUS DEFORMITY OF BOTH GREAT TOES: ICD-10-CM

## 2024-02-20 PROCEDURE — 99212 OFFICE O/P EST SF 10 MIN: CPT | Mod: S$GLB,,, | Performed by: PODIATRIST

## 2024-02-20 PROCEDURE — 99999 PR PBB SHADOW E&M-EST. PATIENT-LVL III: CPT | Mod: PBBFAC,,, | Performed by: PODIATRIST

## 2024-02-20 NOTE — PROGRESS NOTES
Subjective:      Patient ID: Cassandra Garcia is a 68 y.o. female.    Chief Complaint: Wound Care    Cassandra is a 68 y.o. female who presents to the clinic upon referral from Dr. Letty quijano. provider found  for evaluation and treatment of diabetic feet. Cassandra has a past medical history of Anticoagulant long-term use, Diabetes mellitus, and Hypertension. Patient relates no major problem with feet. Only complaints today consist of painful calluses and pain and redness around the base of the left hallux nail. This has been going on for a couple weeks and she relates it keeps her from going walking and exercising    2/20/24: patient returns for follow up bilateral foot care, has been working on getting her diabetic shoes and inserts, doing much better overall however    PCP: Kajal Castle MD        Current shoe gear: Casual shoes    Hemoglobin A1C   Date Value Ref Range Status   01/06/2022 7.8 (H) 4.0 - 5.6 % Final     Comment:     ADA Screening Guidelines:  5.7-6.4%  Consistent with prediabetes  >or=6.5%  Consistent with diabetes    High levels of fetal hemoglobin interfere with the HbA1C  assay. Heterozygous hemoglobin variants (HbS, HgC, etc)do  not significantly interfere with this assay.   However, presence of multiple variants may affect accuracy.           Review of Systems   Constitutional: Negative for chills and fever.   Cardiovascular:  Negative for claudication and leg swelling.   Respiratory:  Negative for shortness of breath.    Skin:  Negative for itching, nail changes and rash.   Musculoskeletal:  Negative for muscle cramps, muscle weakness and myalgias.   Gastrointestinal:  Negative for nausea and vomiting.   Neurological:  Positive for paresthesias. Negative for focal weakness, loss of balance and numbness.           Objective:      Physical Exam  Constitutional:       General: She is not in acute distress.     Appearance: She is well-developed. She is not diaphoretic.   Cardiovascular:      Pulses:            Dorsalis pedis pulses are 2+ on the right side and 2+ on the left side.        Posterior tibial pulses are 2+ on the right side and 2+ on the left side.      Comments: < 3 sec capillary refill time to toes 1-5 bilateral. Toes and feet are warm to touch proximally with normal distal cooling b/l. There is some hair growth on the feet and toes b/l. There is no edema b/l. No spider veins or varicosities present b/l.     Musculoskeletal:      Comments: Equinus noted b/l ankles with < 10 deg DF noted. MMT 5/5 in DF/PF/Inv/Ev resistance with no reproduction of pain in any direction. Passive range of motion of ankle and pedal joints is painless b/l.    Medial 1st MTPJ exostosis bilateral. Lateral deviation of hallux, non trackbound. No pain w/ ROM to 1st or 2nd MTPJs. No First ray hypermobility or sub second MT head callus. There is left foot second toe MTPJ subluxation with the toe drifting dorsal and medial and overlapping and sitting on the base of the hallux nail laterally.      Skin:     General: Skin is warm and dry.      Coloration: Skin is not pale.      Findings: No abrasion, bruising, burn, ecchymosis, erythema, laceration, lesion, petechiae or rash.      Nails: There is no clubbing.      Comments: Skin temperature, texture and turgor within normal limits.    Hyperkeratotic lesions to plantar medial right first met head and left hallux    Left hallux nail paronychia is mostly resolved with mild erythema still present   Neurological:      Mental Status: She is alert and oriented to person, place, and time.      Sensory: No sensory deficit.      Motor: No tremor, atrophy or abnormal muscle tone.      Comments: Negative tinel sign bilateral. Diminished vibratory sensations bilateral   Psychiatric:         Behavior: Behavior normal.               Assessment:       Encounter Diagnoses   Name Primary?    Type II diabetes mellitus with neurological manifestations Yes    Valgus deformity of both great toes     Corn or  callus            Plan:       Cassandra was seen today for wound care.    Diagnoses and all orders for this visit:    Type II diabetes mellitus with neurological manifestations    Valgus deformity of both great toes    Corn or callus        I counseled the patient on her conditions, their implications and medical management.    Shoe inspection. Diabetic Foot Education. Patient reminded of the importance of good nutrition and blood sugar control to help prevent podiatric complications of diabetes. Patient instructed on proper foot hygeine. We discussed wearing proper shoe gear, daily foot inspections, never walking without protective shoe gear, never putting sharp instruments to feet    -=  -Use ebanel cream on the calluses and offload with diabetic shoes and inserts, the prescription and a list of places was dispensed    Return PRN if the redness worsens and we will remove the nail    Return PRN or 1 year diabetic foot exam    Woody Ramírez DPM

## 2024-07-26 ENCOUNTER — TELEPHONE (OUTPATIENT)
Dept: PODIATRY | Facility: CLINIC | Age: 69
End: 2024-07-26
Payer: MEDICARE

## 2024-07-26 NOTE — TELEPHONE ENCOUNTER
Spoke with pt and answered her questions re fit of DM shoes from [place] and she voiced understanding re they need to adjust her heel to make shoe fit better

## 2024-07-26 NOTE — TELEPHONE ENCOUNTER
----- Message from Rich Steiner sent at 7/26/2024  1:33 PM CDT -----  Contact: Self  Type: Needs Medical Advice  Who Called:  Patient    Best Call Back Number: [phone]    Additional Information: Called to speak with office regarding current foot condition. States she has trouble fitting shoes. Please call.

## 2025-03-21 ENCOUNTER — TELEPHONE (OUTPATIENT)
Dept: PODIATRY | Facility: CLINIC | Age: 70
End: 2025-03-21
Payer: MEDICARE

## 2025-03-21 NOTE — TELEPHONE ENCOUNTER
----- Message from Brandie sent at 3/21/2025 11:15 AM CDT -----  Type:  Needs Medical AdviceWho Called: ptWould the patient rather a call back or a response via [facility]? Please callBest Call Back Number: [phone] Information: pt is having problems with her right great toe and would like to know if that's what she was seen for last year.  She has a future appt scheduled of 05/06   Please call back to advise. Thanks!

## 2025-05-06 ENCOUNTER — OFFICE VISIT (OUTPATIENT)
Dept: PODIATRY | Facility: CLINIC | Age: 70
End: 2025-05-06
Payer: MEDICARE

## 2025-05-06 VITALS — BODY MASS INDEX: 24.88 KG/M2 | WEIGHT: 145.75 LBS | HEIGHT: 64 IN

## 2025-05-06 DIAGNOSIS — L84 CORN OR CALLUS: ICD-10-CM

## 2025-05-06 DIAGNOSIS — E11.49 TYPE II DIABETES MELLITUS WITH NEUROLOGICAL MANIFESTATIONS: Primary | ICD-10-CM

## 2025-05-06 DIAGNOSIS — M20.12 VALGUS DEFORMITY OF BOTH GREAT TOES: ICD-10-CM

## 2025-05-06 DIAGNOSIS — L60.0 INGROWN NAIL OF GREAT TOE OF LEFT FOOT: ICD-10-CM

## 2025-05-06 DIAGNOSIS — M20.11 VALGUS DEFORMITY OF BOTH GREAT TOES: ICD-10-CM

## 2025-05-06 PROCEDURE — 1159F MED LIST DOCD IN RCRD: CPT | Mod: CPTII,S$GLB,, | Performed by: PODIATRIST

## 2025-05-06 PROCEDURE — 4010F ACE/ARB THERAPY RXD/TAKEN: CPT | Mod: CPTII,S$GLB,, | Performed by: PODIATRIST

## 2025-05-06 PROCEDURE — 3288F FALL RISK ASSESSMENT DOCD: CPT | Mod: CPTII,S$GLB,, | Performed by: PODIATRIST

## 2025-05-06 PROCEDURE — 99999 PR PBB SHADOW E&M-EST. PATIENT-LVL III: CPT | Mod: PBBFAC,,, | Performed by: PODIATRIST

## 2025-05-06 PROCEDURE — 1101F PT FALLS ASSESS-DOCD LE1/YR: CPT | Mod: CPTII,S$GLB,, | Performed by: PODIATRIST

## 2025-05-06 PROCEDURE — 1160F RVW MEDS BY RX/DR IN RCRD: CPT | Mod: CPTII,S$GLB,, | Performed by: PODIATRIST

## 2025-05-06 PROCEDURE — 99213 OFFICE O/P EST LOW 20 MIN: CPT | Mod: S$GLB,,, | Performed by: PODIATRIST

## 2025-05-06 PROCEDURE — 3008F BODY MASS INDEX DOCD: CPT | Mod: CPTII,S$GLB,, | Performed by: PODIATRIST

## 2025-05-06 NOTE — PROGRESS NOTES
Subjective:      Patient ID: Cassandra Garcia is a 69 y.o. female.    Chief Complaint: Toe Pain    Cassandra is a 69 y.o. female who presents to the clinic upon referral from Dr. Letty quijano. provider found  for evaluation and treatment of diabetic feet. Cassandra has a past medical history of Anticoagulant long-term use, Diabetes mellitus, and Hypertension. Patient relates no major problem with feet. Only complaints today consist of painful calluses and pain and redness around the base of the left hallux nail. This has been going on for a couple weeks and she relates it keeps her from going walking and exercising    2/20/24: patient returns for follow up bilateral foot care, has been working on getting her diabetic shoes and inserts, doing much better overall however    5/6/25: Patient presents for diabetic foot exam and left great toe recurrent ingrowing and bothersome nail. Similar to prior appointments doing well overall    PCP: Kylie Tang, NP        Current shoe gear: Casual shoes    Hemoglobin A1C   Date Value Ref Range Status   01/06/2022 7.8 (H) 4.0 - 5.6 % Final     Comment:     ADA Screening Guidelines:  5.7-6.4%  Consistent with prediabetes  >or=6.5%  Consistent with diabetes    High levels of fetal hemoglobin interfere with the HbA1C  assay. Heterozygous hemoglobin variants (HbS, HgC, etc)do  not significantly interfere with this assay.   However, presence of multiple variants may affect accuracy.           Review of Systems   Constitutional: Negative for chills and fever.   Cardiovascular:  Negative for claudication and leg swelling.   Respiratory:  Negative for shortness of breath.    Skin:  Negative for itching, nail changes and rash.   Musculoskeletal:  Negative for muscle cramps, muscle weakness and myalgias.   Gastrointestinal:  Negative for nausea and vomiting.   Neurological:  Positive for paresthesias. Negative for focal weakness, loss of balance and numbness.           Objective:      Physical  Exam  Constitutional:       General: She is not in acute distress.     Appearance: She is well-developed. She is not diaphoretic.   Cardiovascular:      Pulses:           Dorsalis pedis pulses are 2+ on the right side and 2+ on the left side.        Posterior tibial pulses are 2+ on the right side and 2+ on the left side.      Comments: < 3 sec capillary refill time to toes 1-5 bilateral. Toes and feet are warm to touch proximally with normal distal cooling b/l. There is some hair growth on the feet and toes b/l. There is no edema b/l. No spider veins or varicosities present b/l.     Musculoskeletal:      Comments: Equinus noted b/l ankles with < 10 deg DF noted. MMT 5/5 in DF/PF/Inv/Ev resistance with no reproduction of pain in any direction. Passive range of motion of ankle and pedal joints is painless b/l.    Medial 1st MTPJ exostosis bilateral. Lateral deviation of hallux, non trackbound. No pain w/ ROM to 1st or 2nd MTPJs. No First ray hypermobility or sub second MT head callus. There is left foot second toe MTPJ subluxation with the toe drifting dorsal and medial and overlapping and sitting on the base of the hallux nail laterally.      Skin:     General: Skin is warm and dry.      Coloration: Skin is not pale.      Findings: No abrasion, bruising, burn, ecchymosis, erythema, laceration, lesion, petechiae or rash.      Nails: There is no clubbing.      Comments: Skin temperature, texture and turgor within normal limits.    Hyperkeratotic lesions to plantar medial right first met head and left hallux    Left hallux nail paronychia is mostly resolved with mild erythema still present   Neurological:      Mental Status: She is alert and oriented to person, place, and time.      Sensory: No sensory deficit.      Motor: No tremor, atrophy or abnormal muscle tone.      Comments: Negative tinel sign bilateral. Diminished vibratory sensations bilateral   Psychiatric:         Behavior: Behavior normal.                Assessment:       Encounter Diagnoses   Name Primary?    Type II diabetes mellitus with neurological manifestations Yes    Valgus deformity of both great toes     Corn or callus     Ingrown nail of great toe of left foot            Plan:       Cassandra was seen today for toe pain.    Diagnoses and all orders for this visit:    Type II diabetes mellitus with neurological manifestations  -     DIABETIC SHOES FOR HOME USE    Valgus deformity of both great toes  -     DIABETIC SHOES FOR HOME USE    Corn or callus  -     DIABETIC SHOES FOR HOME USE    Ingrown nail of great toe of left foot  -     DIABETIC SHOES FOR HOME USE        I counseled the patient on her conditions, their implications and medical management.    Shoe inspection. Diabetic Foot Education. Patient reminded of the importance of good nutrition and blood sugar control to help prevent podiatric complications of diabetes. Patient instructed on proper foot hygeine. We discussed wearing proper shoe gear, daily foot inspections, never walking without protective shoe gear, never putting sharp instruments to feet    -=  -Use ebanel cream on the calluses and offload with diabetic shoes and inserts, the prescription and a list of places was dispensed    Return PRN if the redness worsens and we will remove the nail    Return PRN or 1 year diabetic foot exam    Woody Ramírez DPM

## 2025-05-27 ENCOUNTER — TELEPHONE (OUTPATIENT)
Dept: PODIATRY | Facility: CLINIC | Age: 70
End: 2025-05-27
Payer: MEDICARE

## 2025-05-27 NOTE — TELEPHONE ENCOUNTER
----- Message from Breanna Arroyo sent at 5/27/2025  3:28 PM CDT -----  Type:  Needs Medical Advice Who Called: PT Symptoms (please be specific): INFLAMMATION & DRAINAGE How long has patient had these symptoms:  1 [facility] Pharmacy name and phone #:  [facility] Would the patient rather a call back or a response via [facility]? CALL BACK Best Call Back Number: [phone] Additional Information: PT INQUIRING ABOUT THE DIAGNOSIS OF HER TOE CONDITION; PT REPORTS THE NAIL AREA IS INFLAMMED AND THERE IS DRAINAGE OF PUS; SHE WANTS ADVICE ON HOW TO TREAT; SHOULD SHE APPLY OINTMENT; REQUESTING CALL BACK FROM NURSING;  Thanks!

## 2025-05-28 ENCOUNTER — TELEPHONE (OUTPATIENT)
Dept: PODIATRY | Facility: CLINIC | Age: 70
End: 2025-05-28
Payer: MEDICARE

## 2025-05-28 NOTE — TELEPHONE ENCOUNTER
----- Message from [place] sent at 5/28/2025  9:11 AM CDT -----  Type:  Patient Returning CallWho Called:pt Who Left Message for Patient:Kate Does the patient know what this is regarding?:yesWould the patient rather a call back or a response via [facility]? Call Best Call Back Number:[phone] Information:   Please call back to advise. Thanks!

## 2025-06-23 ENCOUNTER — TELEPHONE (OUTPATIENT)
Dept: PODIATRY | Facility: CLINIC | Age: 70
End: 2025-06-23
Payer: MEDICARE

## 2025-06-23 NOTE — TELEPHONE ENCOUNTER
Called patient to let her know that her  was scheduled for the first available on 08/15/25 @ 9:30 am. Phone went to voicemail and  a message was left for a call back.

## (undated) DEVICE — GOWN SURGICAL X-LARGE

## (undated) DEVICE — SOL IRR WATER STRL 3000 ML

## (undated) DEVICE — SEE MEDLINE ITEM 154981

## (undated) DEVICE — SUT CTD VICRYL 3-0 V.L BR

## (undated) DEVICE — PACK LAPSCP/PELVSCPY III TIBRN

## (undated) DEVICE — SUT 2-0 VICRYL / SH (J417)

## (undated) DEVICE — SUT COATED VICRYL 4/0 27IN

## (undated) DEVICE — DRAPE STERI INSTRUMENT 1018

## (undated) DEVICE — TUBING SUC UNIV W/CONN 12FT

## (undated) DEVICE — SOL NACL 0.9% INJ PF/100 150

## (undated) DEVICE — HOOK STAY ELAS 5MM 8EA/PK

## (undated) DEVICE — SPONGE DERMACEA GAUZE 4X4

## (undated) DEVICE — NDL HYPO REG 25G X 1 1/2

## (undated) DEVICE — PACK PERI/GYN OPTIMA

## (undated) DEVICE — SEE MEDLINE ITEM 157181

## (undated) DEVICE — SYR 10CC LUER LOCK

## (undated) DEVICE — TRAY FOLEY 16FR INFECTION CONT

## (undated) DEVICE — DEVICE CAPIO SLIM SUTURE CAPT

## (undated) DEVICE — SYRINGE 0.9% NACL 10MIL PREFIL

## (undated) DEVICE — SUT 2/0 30IN SILK BLK BRAI

## (undated) DEVICE — ELECTRODE REM PLYHSV RETURN 9

## (undated) DEVICE — LUBRICANT SURGILUBE 2 OZ

## (undated) DEVICE — RETRACTOR STERILE PLASTIC G2

## (undated) DEVICE — TRAY MINOR GEN SURG

## (undated) DEVICE — SUT 3-0 VICRYL SH CR/8 18

## (undated) DEVICE — CUP MEDICINE GRADUATED 1OZ

## (undated) DEVICE — SUT VICRYL 3-0 27 SH

## (undated) DEVICE — BLADE SURG #15 CARBON STEEL

## (undated) DEVICE — TOWEL OR DISP STRL BLUE 4/PK

## (undated) DEVICE — SUT CAPIO CT BRD POLY TC 0

## (undated) DEVICE — SUT VICRYL 3-0 27 RB-1

## (undated) DEVICE — SET IRR URLGY 2LINE UNIV SPIKE